# Patient Record
Sex: FEMALE | Race: ASIAN | URBAN - METROPOLITAN AREA
[De-identification: names, ages, dates, MRNs, and addresses within clinical notes are randomized per-mention and may not be internally consistent; named-entity substitution may affect disease eponyms.]

---

## 2021-04-25 ENCOUNTER — IMMUNIZATION (OUTPATIENT)
Dept: LAB | Age: 31
End: 2021-04-25

## 2021-04-25 DIAGNOSIS — Z23 NEED FOR VACCINATION: Primary | ICD-10-CM

## 2021-04-25 PROCEDURE — 0001A COVID 19 PFIZER-BIONTECH: CPT

## 2021-04-25 PROCEDURE — 91300 COVID 19 PFIZER-BIONTECH: CPT

## 2021-05-16 ENCOUNTER — IMMUNIZATION (OUTPATIENT)
Dept: LAB | Age: 31
End: 2021-05-16
Attending: HOSPITALIST

## 2021-05-16 DIAGNOSIS — Z23 NEED FOR VACCINATION: Primary | ICD-10-CM

## 2021-05-16 PROCEDURE — 91300 COVID 19 PFIZER-BIONTECH: CPT

## 2021-05-16 PROCEDURE — 0002A COVID 19 PFIZER-BIONTECH: CPT

## 2021-09-22 ENCOUNTER — HOSPITAL ENCOUNTER (EMERGENCY)
Age: 31
Discharge: LEFT BEFORE DISCHARGE | End: 2021-09-22

## 2021-09-22 VITALS
BODY MASS INDEX: 20.06 KG/M2 | HEIGHT: 65 IN | WEIGHT: 120.37 LBS | HEART RATE: 87 BPM | DIASTOLIC BLOOD PRESSURE: 76 MMHG | OXYGEN SATURATION: 100 % | TEMPERATURE: 97.9 F | RESPIRATION RATE: 16 BRPM | SYSTOLIC BLOOD PRESSURE: 126 MMHG

## 2021-09-22 PROCEDURE — 10003627 HB COUNTER ED NO SERVICE

## 2021-09-22 PROCEDURE — 93005 ELECTROCARDIOGRAM TRACING: CPT | Performed by: EMERGENCY MEDICINE

## 2021-09-23 LAB
ATRIAL RATE (BPM): 89
P AXIS (DEGREES): 54
PR-INTERVAL (MSEC): 148
QRS-INTERVAL (MSEC): 88
QT-INTERVAL (MSEC): 384
QTC: 467
R AXIS (DEGREES): 87
REPORT TEXT: NORMAL
T AXIS (DEGREES): 29
VENTRICULAR RATE EKG/MIN (BPM): 89

## 2023-09-06 ENCOUNTER — TELEPHONE (OUTPATIENT)
Dept: OBGYN CLINIC | Facility: CLINIC | Age: 33
End: 2023-09-06

## 2023-09-06 NOTE — TELEPHONE ENCOUNTER
LMP 7/4/23. Cycles every 30 days. Pt agrees to see both male and female providers. Pt has already started taking pn vitamin with DHA, folic acid and iron. Booked pt for OBN appt on 9/18/23. Pt also wishes to schedule new OB appt. Booked pt for new OB with [facility] on 9/19. Advised pt she will need to have 1st trimester labs completed prior to new OB appt otherwise this appt will be rescheduled. Pt verbalized understanding. Pt agrees to call clinic back should she have any additional questions or concerns.

## 2023-09-18 ENCOUNTER — NURSE ONLY (OUTPATIENT)
Dept: OBGYN CLINIC | Facility: CLINIC | Age: 33
End: 2023-09-18

## 2023-09-18 DIAGNOSIS — Z34.81 ENCOUNTER FOR SUPERVISION OF OTHER NORMAL PREGNANCY IN FIRST TRIMESTER: ICD-10-CM

## 2023-09-18 DIAGNOSIS — O26.811 PREGNANCY RELATED FATIGUE IN FIRST TRIMESTER: Primary | ICD-10-CM

## 2023-09-18 RX ORDER — CHOLECALCIFEROL (VITAMIN D3) 25 MCG
1 TABLET,CHEWABLE ORAL DAILY
COMMUNITY

## 2023-09-19 ENCOUNTER — LAB ENCOUNTER (OUTPATIENT)
Dept: LAB | Facility: HOSPITAL | Age: 33
End: 2023-09-19
Attending: OBSTETRICS & GYNECOLOGY
Payer: MEDICAID

## 2023-09-19 DIAGNOSIS — Z34.81 ENCOUNTER FOR SUPERVISION OF OTHER NORMAL PREGNANCY IN FIRST TRIMESTER: ICD-10-CM

## 2023-09-19 LAB
ANTIBODY SCREEN: NEGATIVE
BASOPHILS # BLD AUTO: 0.04 X10(3) UL (ref 0–0.2)
BASOPHILS NFR BLD AUTO: 0.5 %
DEPRECATED RDW RBC AUTO: 39.5 FL (ref 35.1–46.3)
EOSINOPHIL # BLD AUTO: 0.27 X10(3) UL (ref 0–0.7)
EOSINOPHIL NFR BLD AUTO: 3.4 %
ERYTHROCYTE [DISTWIDTH] IN BLOOD BY AUTOMATED COUNT: 12 % (ref 11–15)
HBV SURFACE AG SER-ACNC: <0.1 [IU]/L
HBV SURFACE AG SERPL QL IA: NONREACTIVE
HCG SERPL QL: POSITIVE
HCT VFR BLD AUTO: 38.8 %
HCV AB SERPL QL IA: NONREACTIVE
HGB BLD-MCNC: 13.3 G/DL
IMM GRANULOCYTES # BLD AUTO: 0.03 X10(3) UL (ref 0–1)
IMM GRANULOCYTES NFR BLD: 0.4 %
LYMPHOCYTES # BLD AUTO: 1.39 X10(3) UL (ref 1–4)
LYMPHOCYTES NFR BLD AUTO: 17.4 %
MCH RBC QN AUTO: 30.9 PG (ref 26–34)
MCHC RBC AUTO-ENTMCNC: 34.3 G/DL (ref 31–37)
MCV RBC AUTO: 90 FL
MONOCYTES # BLD AUTO: 0.45 X10(3) UL (ref 0.1–1)
MONOCYTES NFR BLD AUTO: 5.6 %
NEUTROPHILS # BLD AUTO: 5.8 X10 (3) UL (ref 1.5–7.7)
NEUTROPHILS # BLD AUTO: 5.8 X10(3) UL (ref 1.5–7.7)
NEUTROPHILS NFR BLD AUTO: 72.7 %
PLATELET # BLD AUTO: 210 10(3)UL (ref 150–450)
RBC # BLD AUTO: 4.31 X10(6)UL
RH BLOOD TYPE: POSITIVE
RUBV IGG SER QL: POSITIVE
RUBV IGG SER-ACNC: 50.9 IU/ML (ref 10–?)
WBC # BLD AUTO: 8 X10(3) UL (ref 4–11)

## 2023-09-19 PROCEDURE — 87340 HEPATITIS B SURFACE AG IA: CPT

## 2023-09-19 PROCEDURE — 84703 CHORIONIC GONADOTROPIN ASSAY: CPT

## 2023-09-19 PROCEDURE — 85025 COMPLETE CBC W/AUTO DIFF WBC: CPT

## 2023-09-19 PROCEDURE — 36415 COLL VENOUS BLD VENIPUNCTURE: CPT

## 2023-09-19 PROCEDURE — 86901 BLOOD TYPING SEROLOGIC RH(D): CPT

## 2023-09-19 PROCEDURE — 86762 RUBELLA ANTIBODY: CPT

## 2023-09-19 PROCEDURE — 86787 VARICELLA-ZOSTER ANTIBODY: CPT

## 2023-09-19 PROCEDURE — 87389 HIV-1 AG W/HIV-1&-2 AB AG IA: CPT

## 2023-09-19 PROCEDURE — 86900 BLOOD TYPING SEROLOGIC ABO: CPT

## 2023-09-19 PROCEDURE — 86780 TREPONEMA PALLIDUM: CPT

## 2023-09-19 PROCEDURE — 86850 RBC ANTIBODY SCREEN: CPT

## 2023-09-19 PROCEDURE — 87086 URINE CULTURE/COLONY COUNT: CPT

## 2023-09-19 PROCEDURE — 86803 HEPATITIS C AB TEST: CPT

## 2023-09-20 LAB
T PALLIDUM AB SER QL: NEGATIVE
VZV IGG SER IA-ACNC: 607.1 (ref 165–?)

## 2023-09-26 ENCOUNTER — INITIAL PRENATAL (OUTPATIENT)
Dept: OBGYN CLINIC | Facility: CLINIC | Age: 33
End: 2023-09-26

## 2023-09-26 ENCOUNTER — TELEPHONE (OUTPATIENT)
Dept: OBGYN CLINIC | Facility: CLINIC | Age: 33
End: 2023-09-26

## 2023-09-26 VITALS
WEIGHT: 124 LBS | HEART RATE: 86 BPM | SYSTOLIC BLOOD PRESSURE: 101 MMHG | HEIGHT: 65 IN | BODY MASS INDEX: 20.66 KG/M2 | DIASTOLIC BLOOD PRESSURE: 69 MMHG

## 2023-09-26 DIAGNOSIS — Z36.9 FIRST TRIMESTER SCREENING: Primary | ICD-10-CM

## 2023-09-26 DIAGNOSIS — Z34.91 ENCOUNTER FOR SUPERVISION OF NORMAL PREGNANCY IN FIRST TRIMESTER, UNSPECIFIED GRAVIDITY: Primary | ICD-10-CM

## 2023-09-26 PROBLEM — N84.1 CERVICAL POLYP: Status: ACTIVE | Noted: 2023-09-26

## 2023-09-26 LAB
BILIRUBIN: NEGATIVE
GLUCOSE (URINE DIPSTICK): NEGATIVE MG/DL
KETONES (URINE DIPSTICK): NEGATIVE MG/DL
LEUKOCYTES: NEGATIVE
MULTISTIX LOT#: 57 NUMERIC
NITRITE, URINE: NEGATIVE
OCCULT BLOOD: NEGATIVE
PH, URINE: 7.5 (ref 4.5–8)
PROTEIN (URINE DIPSTICK): NEGATIVE MG/DL
SPECIFIC GRAVITY: 1.01 (ref 1–1.03)
UROBILINOGEN,SEMI-QN: 0.2 MG/DL (ref 0–1.9)

## 2023-09-26 PROCEDURE — 3008F BODY MASS INDEX DOCD: CPT | Performed by: OBSTETRICS & GYNECOLOGY

## 2023-09-26 PROCEDURE — 76801 OB US < 14 WKS SINGLE FETUS: CPT | Performed by: OBSTETRICS & GYNECOLOGY

## 2023-09-26 PROCEDURE — 3078F DIAST BP <80 MM HG: CPT | Performed by: OBSTETRICS & GYNECOLOGY

## 2023-09-26 PROCEDURE — 3074F SYST BP LT 130 MM HG: CPT | Performed by: OBSTETRICS & GYNECOLOGY

## 2023-09-26 PROCEDURE — 81002 URINALYSIS NONAUTO W/O SCOPE: CPT | Performed by: OBSTETRICS & GYNECOLOGY

## 2023-09-26 PROCEDURE — 0500F INITIAL PRENATAL CARE VISIT: CPT | Performed by: OBSTETRICS & GYNECOLOGY

## 2023-09-26 NOTE — TELEPHONE ENCOUNTER
Rene Jc from  provided pt with MFM number.  And pt is aware she is to call and schedule appt for FTS

## 2023-09-26 NOTE — TELEPHONE ENCOUNTER
Pt wishes / needs the following.  Please do referral & send MFM order for:    [ x ]  Genetic Screening

## 2023-09-26 NOTE — TELEPHONE ENCOUNTER
[facility]    Genetic screening order has been placed. And is to contact Maternal Fetal Medicine at [phone] to schedule appt.

## 2023-09-26 NOTE — PROGRESS NOTES
New OB. Pap and GCT collected today. Requesting genetic testing. Cervical polyp noted on exam today. US SLIUP CWD.  RTC 4 wks

## 2023-09-27 ENCOUNTER — TELEPHONE (OUTPATIENT)
Dept: OBGYN CLINIC | Facility: CLINIC | Age: 33
End: 2023-09-27

## 2023-09-27 PROBLEM — A74.9 POSITIVE CHLAMYDIA PCR: Status: ACTIVE | Noted: 2023-09-27

## 2023-09-27 LAB
C TRACH DNA SPEC QL NAA+PROBE: POSITIVE
HPV I/H RISK 1 DNA SPEC QL NAA+PROBE: NEGATIVE
N GONORRHOEA DNA SPEC QL NAA+PROBE: NEGATIVE
T VAGINALIS RRNA SPEC QL NAA+PROBE: NEGATIVE

## 2023-09-27 RX ORDER — AZITHROMYCIN 500 MG/1
1000 TABLET, FILM COATED ORAL ONCE
Qty: 2 TABLET | Refills: 0 | Status: SHIPPED | OUTPATIENT
Start: 2023-09-27 | End: 2023-09-27

## 2023-09-27 NOTE — TELEPHONE ENCOUNTER
Please send Azithromycin 1 g for one dose. Her partner also needs to get treatment. She needs to abstain from intercourse until both of them have completed treatment.

## 2023-09-27 NOTE — TELEPHONE ENCOUNTER
[facility] reporting +chlamydia results. Pt is 12w1d, seen for NPN on 9/26 with TODD. Message to [facility] (on-call) to please review and advise.      Component  Ref Range & Units    Chlamydia Trachomatis Amplified RNA  Negative Positive Abnormal    Neisseria Gonorrhoeae Amplified RNA  Negative Negative   Chlam/GC Source Cervical/endocervical   Resulting Agency [place] [facility] [address])              Specimen Collected: 09/26/23  9:51 AM Last Resulted: 09/27/23  2:16 PM

## 2023-09-27 NOTE — TELEPHONE ENCOUNTER
Informed pt of results and JMN recs. Pt verbalized understanding. Pt's preferred pharmacy verified and Rx sent.

## 2023-09-28 ENCOUNTER — HOSPITAL ENCOUNTER (OUTPATIENT)
Dept: PERINATAL CARE | Facility: HOSPITAL | Age: 33
Discharge: HOME | End: 2023-09-28
Attending: OBSTETRICS & GYNECOLOGY
Payer: MEDICAID

## 2023-09-28 ENCOUNTER — LAB ENCOUNTER (OUTPATIENT)
Dept: LAB | Facility: HOSPITAL | Age: 33
End: 2023-09-28
Attending: OBSTETRICS & GYNECOLOGY
Payer: MEDICAID

## 2023-09-28 VITALS
HEART RATE: 84 BPM | BODY MASS INDEX: 21 KG/M2 | SYSTOLIC BLOOD PRESSURE: 113 MMHG | DIASTOLIC BLOOD PRESSURE: 80 MMHG | WEIGHT: 124 LBS

## 2023-09-28 DIAGNOSIS — Z36.9 FIRST TRIMESTER SCREENING: ICD-10-CM

## 2023-09-28 DIAGNOSIS — A74.9 POSITIVE CHLAMYDIA PCR: ICD-10-CM

## 2023-09-28 DIAGNOSIS — N84.1 CERVICAL POLYP: ICD-10-CM

## 2023-09-28 DIAGNOSIS — Z31.69 GENERAL COUNSELING AND ADVICE FOR PROCREATIVE MANAGEMENT: ICD-10-CM

## 2023-09-28 DIAGNOSIS — Z34.81 PRENATAL CARE, SUBSEQUENT PREGNANCY, FIRST TRIMESTER: ICD-10-CM

## 2023-09-28 DIAGNOSIS — Z36.9 FIRST TRIMESTER SCREENING: Primary | ICD-10-CM

## 2023-09-28 PROCEDURE — 76813 OB US NUCHAL MEAS 1 GEST: CPT | Performed by: OBSTETRICS & GYNECOLOGY

## 2023-09-28 PROCEDURE — 76801 OB US < 14 WKS SINGLE FETUS: CPT | Performed by: OBSTETRICS & GYNECOLOGY

## 2023-09-29 ENCOUNTER — LAB ENCOUNTER (OUTPATIENT)
Dept: LAB | Facility: HOSPITAL | Age: 33
End: 2023-09-29
Attending: OBSTETRICS & GYNECOLOGY
Payer: MEDICAID

## 2023-09-29 DIAGNOSIS — Z34.81 PRENATAL CARE, SUBSEQUENT PREGNANCY, FIRST TRIMESTER: ICD-10-CM

## 2023-10-01 ENCOUNTER — PATIENT MESSAGE (OUTPATIENT)
Dept: OBGYN CLINIC | Facility: CLINIC | Age: 33
End: 2023-10-01

## 2023-10-03 NOTE — TELEPHONE ENCOUNTER
From: Kartik Medina  To: Henrry Badillo  Sent: 10/1/2023 5:53 PM CDT  Subject: azithromycin    Sola Tristan Rick  I took azithromycin last night and felt like vomiting and had diarrhea and today i feel nauseous, and feel like throwing up is it normal?

## 2023-10-04 LAB
GESTATIONAL AGE > OR = 9W:: YES
MONOSOMY X (TURNER SYNDROME): NOT DETECTED
TEST RESULT: NEGATIVE
TRISOMY 13 (PATAU SYNDROME): NEGATIVE
TRISOMY 18 (EDWARDS SYNDROME): NEGATIVE
TRISOMY 21 (DOWN SYNDROME): NEGATIVE
XXX (TRIPLE X SYNDROME): NOT DETECTED
XXY (KLINEFELTER SYNDROME): NOT DETECTED
XYY (JACOBS SYNDROME): NOT DETECTED

## 2023-10-05 ENCOUNTER — TELEPHONE (OUTPATIENT)
Dept: OBGYN CLINIC | Facility: CLINIC | Age: 33
End: 2023-10-05

## 2023-10-05 NOTE — TELEPHONE ENCOUNTER
Pt receiving care through [facility]. Pt wanting to transfer to the [facility] office. Pt scheduled for meet and greet on 10/10/23 11:15 am with Florence Ordonez CNM.

## 2023-10-05 NOTE — TELEPHONE ENCOUNTER
Patient has a few questions she would like to ask prior to transferring care from [facility]. Please call.

## 2023-10-24 ENCOUNTER — ROUTINE PRENATAL (OUTPATIENT)
Dept: OBGYN CLINIC | Facility: CLINIC | Age: 33
End: 2023-10-24

## 2023-10-24 VITALS
SYSTOLIC BLOOD PRESSURE: 103 MMHG | WEIGHT: 127.38 LBS | DIASTOLIC BLOOD PRESSURE: 73 MMHG | BODY MASS INDEX: 21 KG/M2 | HEART RATE: 88 BPM

## 2023-10-24 DIAGNOSIS — Z34.92 ENCOUNTER FOR SUPERVISION OF NORMAL PREGNANCY IN SECOND TRIMESTER, UNSPECIFIED GRAVIDITY: Primary | ICD-10-CM

## 2023-10-24 LAB
BILIRUBIN: NEGATIVE
GLUCOSE (URINE DIPSTICK): NEGATIVE MG/DL
KETONES (URINE DIPSTICK): NEGATIVE MG/DL
MULTISTIX LOT#: ABNORMAL NUMERIC
NITRITE, URINE: NEGATIVE
OCCULT BLOOD: NEGATIVE
PH, URINE: 7.5 (ref 4.5–8)
SPECIFIC GRAVITY: 1.01 (ref 1–1.03)
UROBILINOGEN,SEMI-QN: 0.2 MG/DL (ref 0–1.9)

## 2023-10-24 PROCEDURE — 3078F DIAST BP <80 MM HG: CPT | Performed by: OBSTETRICS & GYNECOLOGY

## 2023-10-24 PROCEDURE — 3074F SYST BP LT 130 MM HG: CPT | Performed by: OBSTETRICS & GYNECOLOGY

## 2023-10-24 PROCEDURE — 99212 OFFICE O/P EST SF 10 MIN: CPT | Performed by: OBSTETRICS & GYNECOLOGY

## 2023-10-24 PROCEDURE — 81002 URINALYSIS NONAUTO W/O SCOPE: CPT | Performed by: OBSTETRICS & GYNECOLOGY

## 2023-10-24 NOTE — PROGRESS NOTES
Undecided on flu shot. Only took meds 3 1/2 wks ago -- will need GC/Chl SALOME next visit. Asked pt about wish to transfer to midwives -- pt never did appt -- wishes to stay w/ us. Reviewed if wishes for birth plan we need to review in office.

## 2023-11-15 ENCOUNTER — ROUTINE PRENATAL (OUTPATIENT)
Dept: OBGYN CLINIC | Facility: CLINIC | Age: 33
End: 2023-11-15

## 2023-11-15 VITALS
WEIGHT: 128 LBS | SYSTOLIC BLOOD PRESSURE: 104 MMHG | DIASTOLIC BLOOD PRESSURE: 70 MMHG | BODY MASS INDEX: 21 KG/M2 | HEART RATE: 73 BPM

## 2023-11-15 DIAGNOSIS — Z34.82 ENCOUNTER FOR SUPERVISION OF OTHER NORMAL PREGNANCY IN SECOND TRIMESTER: Primary | ICD-10-CM

## 2023-11-15 LAB
APPEARANCE: CLEAR
GLUCOSE (URINE DIPSTICK): NEGATIVE MG/DL
MULTISTIX LOT#: NORMAL NUMERIC
NITRITE, URINE: NEGATIVE
URINE-COLOR: YELLOW

## 2023-11-15 PROCEDURE — 81002 URINALYSIS NONAUTO W/O SCOPE: CPT | Performed by: OBSTETRICS & GYNECOLOGY

## 2023-11-15 PROCEDURE — 3078F DIAST BP <80 MM HG: CPT | Performed by: OBSTETRICS & GYNECOLOGY

## 2023-11-15 PROCEDURE — 3074F SYST BP LT 130 MM HG: CPT | Performed by: OBSTETRICS & GYNECOLOGY

## 2023-11-16 LAB
C TRACH DNA SPEC QL NAA+PROBE: NEGATIVE
N GONORRHOEA DNA SPEC QL NAA+PROBE: NEGATIVE

## 2023-11-17 LAB — T VAGINALIS RRNA SPEC QL NAA+PROBE: NEGATIVE

## 2023-12-04 ENCOUNTER — HOSPITAL ENCOUNTER (OUTPATIENT)
Dept: ULTRASOUND IMAGING | Facility: HOSPITAL | Age: 33
Discharge: HOME | End: 2023-12-04
Attending: OBSTETRICS & GYNECOLOGY
Payer: MEDICAID

## 2023-12-04 DIAGNOSIS — Z34.92 ENCOUNTER FOR SUPERVISION OF NORMAL PREGNANCY IN SECOND TRIMESTER, UNSPECIFIED GRAVIDITY: ICD-10-CM

## 2023-12-04 PROCEDURE — 76805 OB US >/= 14 WKS SNGL FETUS: CPT | Performed by: OBSTETRICS & GYNECOLOGY

## 2023-12-18 ENCOUNTER — ROUTINE PRENATAL (OUTPATIENT)
Dept: OBGYN CLINIC | Facility: CLINIC | Age: 33
End: 2023-12-18

## 2023-12-18 VITALS
HEART RATE: 74 BPM | DIASTOLIC BLOOD PRESSURE: 67 MMHG | SYSTOLIC BLOOD PRESSURE: 104 MMHG | WEIGHT: 136.63 LBS | BODY MASS INDEX: 23 KG/M2

## 2023-12-18 DIAGNOSIS — Z34.82 ENCOUNTER FOR SUPERVISION OF OTHER NORMAL PREGNANCY IN SECOND TRIMESTER: Primary | ICD-10-CM

## 2023-12-18 LAB
APPEARANCE: CLEAR
BILIRUBIN: NEGATIVE
GLUCOSE (URINE DIPSTICK): NEGATIVE MG/DL
KETONES (URINE DIPSTICK): NEGATIVE MG/DL
MULTISTIX LOT#: ABNORMAL NUMERIC
NITRITE, URINE: NEGATIVE
PH, URINE: 6.5 (ref 4.5–8)
SPECIFIC GRAVITY: 1.01 (ref 1–1.03)
URINE-COLOR: YELLOW
UROBILINOGEN,SEMI-QN: 0.2 MG/DL (ref 0–1.9)

## 2023-12-18 PROCEDURE — 3078F DIAST BP <80 MM HG: CPT | Performed by: OBSTETRICS & GYNECOLOGY

## 2023-12-18 PROCEDURE — 3074F SYST BP LT 130 MM HG: CPT | Performed by: OBSTETRICS & GYNECOLOGY

## 2023-12-18 PROCEDURE — 81002 URINALYSIS NONAUTO W/O SCOPE: CPT | Performed by: OBSTETRICS & GYNECOLOGY

## 2024-01-23 ENCOUNTER — ROUTINE PRENATAL (OUTPATIENT)
Dept: OBGYN CLINIC | Facility: CLINIC | Age: 34
End: 2024-01-23

## 2024-01-23 ENCOUNTER — LAB ENCOUNTER (OUTPATIENT)
Dept: LAB | Facility: HOSPITAL | Age: 34
End: 2024-01-23
Attending: OBSTETRICS & GYNECOLOGY
Payer: COMMERCIAL

## 2024-01-23 VITALS
WEIGHT: 139 LBS | HEART RATE: 86 BPM | BODY MASS INDEX: 23 KG/M2 | SYSTOLIC BLOOD PRESSURE: 104 MMHG | DIASTOLIC BLOOD PRESSURE: 70 MMHG

## 2024-01-23 DIAGNOSIS — Z34.92 ENCOUNTER FOR SUPERVISION OF NORMAL PREGNANCY IN SECOND TRIMESTER, UNSPECIFIED GRAVIDITY: Primary | ICD-10-CM

## 2024-01-23 LAB
BILIRUBIN: NEGATIVE
DEPRECATED RDW RBC AUTO: 40.9 FL (ref 35.1–46.3)
ERYTHROCYTE [DISTWIDTH] IN BLOOD BY AUTOMATED COUNT: 12.2 % (ref 11–15)
GLUCOSE (URINE DIPSTICK): NEGATIVE MG/DL
GLUCOSE 1H P GLC SERPL-MCNC: 149 MG/DL
HCT VFR BLD AUTO: 36.2 %
HGB BLD-MCNC: 11.9 G/DL
KETONES (URINE DIPSTICK): NEGATIVE MG/DL
LEUKOCYTES: NEGATIVE
MCH RBC QN AUTO: 30.1 PG (ref 26–34)
MCHC RBC AUTO-ENTMCNC: 32.9 G/DL (ref 31–37)
MCV RBC AUTO: 91.6 FL
MULTISTIX LOT#: 57 NUMERIC
NITRITE, URINE: NEGATIVE
OCCULT BLOOD: NEGATIVE
PH, URINE: 7.5 (ref 4.5–8)
PLATELET # BLD AUTO: 172 10(3)UL (ref 150–450)
RBC # BLD AUTO: 3.95 X10(6)UL
SPECIFIC GRAVITY: 1.01 (ref 1–1.03)
UROBILINOGEN,SEMI-QN: 0.2 MG/DL (ref 0–1.9)
WBC # BLD AUTO: 8.7 X10(3) UL (ref 4–11)

## 2024-01-23 PROCEDURE — 85027 COMPLETE CBC AUTOMATED: CPT | Performed by: OBSTETRICS & GYNECOLOGY

## 2024-01-23 PROCEDURE — 3078F DIAST BP <80 MM HG: CPT | Performed by: OBSTETRICS & GYNECOLOGY

## 2024-01-23 PROCEDURE — 3074F SYST BP LT 130 MM HG: CPT | Performed by: OBSTETRICS & GYNECOLOGY

## 2024-01-23 PROCEDURE — 36415 COLL VENOUS BLD VENIPUNCTURE: CPT | Performed by: OBSTETRICS & GYNECOLOGY

## 2024-01-23 PROCEDURE — 82950 GLUCOSE TEST: CPT | Performed by: OBSTETRICS & GYNECOLOGY

## 2024-01-23 PROCEDURE — 81002 URINALYSIS NONAUTO W/O SCOPE: CPT | Performed by: OBSTETRICS & GYNECOLOGY

## 2024-01-23 NOTE — PROGRESS NOTES
Feeling well.  Good FM.  This appt was a reschedule due to bad weather last week.  Has 2T labs rx'ed.  RTC 2 wks.

## 2024-01-24 ENCOUNTER — TELEPHONE (OUTPATIENT)
Dept: OBGYN CLINIC | Facility: CLINIC | Age: 34
End: 2024-01-24

## 2024-01-24 DIAGNOSIS — O99.810 ABNORMAL GLUCOSE TOLERANCE TEST (GTT) DURING PREGNANCY, ANTEPARTUM: Primary | ICD-10-CM

## 2024-01-24 NOTE — TELEPHONE ENCOUNTER
Pt informed of results and recs for 3 hr gtt.  Pt advised to fast for 12 hours and call for appt.  Number to central scheduling given.  Order placed.

## 2024-02-07 ENCOUNTER — ROUTINE PRENATAL (OUTPATIENT)
Dept: OBGYN CLINIC | Facility: CLINIC | Age: 34
End: 2024-02-07

## 2024-02-07 VITALS
HEART RATE: 78 BPM | WEIGHT: 141 LBS | SYSTOLIC BLOOD PRESSURE: 107 MMHG | DIASTOLIC BLOOD PRESSURE: 71 MMHG | BODY MASS INDEX: 23 KG/M2

## 2024-02-07 DIAGNOSIS — Z34.80 ENCOUNTER FOR SUPERVISION OF OTHER NORMAL PREGNANCY, UNSPECIFIED TRIMESTER: Primary | ICD-10-CM

## 2024-02-07 LAB
BILIRUBIN: NEGATIVE
GLUCOSE (URINE DIPSTICK): NEGATIVE MG/DL
KETONES (URINE DIPSTICK): NEGATIVE MG/DL
MULTISTIX LOT#: ABNORMAL NUMERIC
NITRITE, URINE: NEGATIVE
PH, URINE: 7 (ref 4.5–8)
PROTEIN (URINE DIPSTICK): NEGATIVE MG/DL
SPECIFIC GRAVITY: 1 (ref 1–1.03)
UROBILINOGEN,SEMI-QN: 0.2 MG/DL (ref 0–1.9)

## 2024-02-07 PROCEDURE — 3074F SYST BP LT 130 MM HG: CPT | Performed by: OBSTETRICS & GYNECOLOGY

## 2024-02-07 PROCEDURE — 3078F DIAST BP <80 MM HG: CPT | Performed by: OBSTETRICS & GYNECOLOGY

## 2024-02-07 PROCEDURE — 81002 URINALYSIS NONAUTO W/O SCOPE: CPT | Performed by: OBSTETRICS & GYNECOLOGY

## 2024-02-19 ENCOUNTER — ROUTINE PRENATAL (OUTPATIENT)
Dept: OBGYN CLINIC | Facility: CLINIC | Age: 34
End: 2024-02-19

## 2024-02-19 VITALS
SYSTOLIC BLOOD PRESSURE: 102 MMHG | HEART RATE: 85 BPM | DIASTOLIC BLOOD PRESSURE: 69 MMHG | WEIGHT: 142 LBS | BODY MASS INDEX: 24 KG/M2

## 2024-02-19 DIAGNOSIS — Z34.91 ENCOUNTER FOR SUPERVISION OF NORMAL PREGNANCY IN FIRST TRIMESTER, UNSPECIFIED GRAVIDITY (HCC): Primary | ICD-10-CM

## 2024-02-19 LAB
APPEARANCE: CLEAR
BILIRUBIN: NEGATIVE
GLUCOSE (URINE DIPSTICK): NEGATIVE MG/DL
KETONES (URINE DIPSTICK): NEGATIVE MG/DL
MULTISTIX LOT#: ABNORMAL NUMERIC
NITRITE, URINE: NEGATIVE
OCCULT BLOOD: NEGATIVE
PH, URINE: 7 (ref 4.5–8)
PROTEIN (URINE DIPSTICK): NEGATIVE MG/DL
SPECIFIC GRAVITY: 1.01 (ref 1–1.03)
URINE-COLOR: YELLOW
UROBILINOGEN,SEMI-QN: 0.2 MG/DL (ref 0–1.9)

## 2024-02-19 PROCEDURE — 3074F SYST BP LT 130 MM HG: CPT | Performed by: OBSTETRICS & GYNECOLOGY

## 2024-02-19 PROCEDURE — 3078F DIAST BP <80 MM HG: CPT | Performed by: OBSTETRICS & GYNECOLOGY

## 2024-02-19 PROCEDURE — 81002 URINALYSIS NONAUTO W/O SCOPE: CPT | Performed by: OBSTETRICS & GYNECOLOGY

## 2024-02-22 ENCOUNTER — LABORATORY ENCOUNTER (OUTPATIENT)
Dept: LAB | Facility: HOSPITAL | Age: 34
End: 2024-02-22
Attending: OBSTETRICS & GYNECOLOGY
Payer: COMMERCIAL

## 2024-02-22 DIAGNOSIS — O99.810 ABNORMAL GLUCOSE TOLERANCE TEST (GTT) DURING PREGNANCY, ANTEPARTUM (HCC): ICD-10-CM

## 2024-02-22 LAB
GLUCOSE 1H P GLC SERPL-MCNC: 163 MG/DL
GLUCOSE 2H P GLC SERPL-MCNC: 129 MG/DL
GLUCOSE 3H P GLC SERPL-MCNC: 107 MG/DL (ref 70–140)
GLUCOSE P FAST SERPL-MCNC: 72 MG/DL

## 2024-02-22 PROCEDURE — 82951 GLUCOSE TOLERANCE TEST (GTT): CPT

## 2024-02-22 PROCEDURE — 82952 GTT-ADDED SAMPLES: CPT

## 2024-02-22 PROCEDURE — 36415 COLL VENOUS BLD VENIPUNCTURE: CPT

## 2024-03-05 ENCOUNTER — ROUTINE PRENATAL (OUTPATIENT)
Dept: OBGYN CLINIC | Facility: CLINIC | Age: 34
End: 2024-03-05

## 2024-03-05 ENCOUNTER — LAB ENCOUNTER (OUTPATIENT)
Dept: LAB | Facility: HOSPITAL | Age: 34
End: 2024-03-05
Attending: OBSTETRICS & GYNECOLOGY
Payer: COMMERCIAL

## 2024-03-05 VITALS
HEART RATE: 73 BPM | SYSTOLIC BLOOD PRESSURE: 112 MMHG | WEIGHT: 143.19 LBS | DIASTOLIC BLOOD PRESSURE: 73 MMHG | BODY MASS INDEX: 24 KG/M2

## 2024-03-05 DIAGNOSIS — Z34.91 ENCOUNTER FOR SUPERVISION OF NORMAL PREGNANCY IN FIRST TRIMESTER, UNSPECIFIED GRAVIDITY (HCC): Primary | ICD-10-CM

## 2024-03-05 DIAGNOSIS — Z34.91 ENCOUNTER FOR SUPERVISION OF NORMAL PREGNANCY IN FIRST TRIMESTER, UNSPECIFIED GRAVIDITY (HCC): ICD-10-CM

## 2024-03-05 LAB
BILIRUBIN: NEGATIVE
DEPRECATED RDW RBC AUTO: 40.7 FL (ref 35.1–46.3)
ERYTHROCYTE [DISTWIDTH] IN BLOOD BY AUTOMATED COUNT: 12.4 % (ref 11–15)
GLUCOSE (URINE DIPSTICK): NEGATIVE MG/DL
HCT VFR BLD AUTO: 39.8 %
HGB BLD-MCNC: 13.1 G/DL
KETONES (URINE DIPSTICK): NEGATIVE MG/DL
MCH RBC QN AUTO: 29.9 PG (ref 26–34)
MCHC RBC AUTO-ENTMCNC: 32.9 G/DL (ref 31–37)
MCV RBC AUTO: 90.9 FL
MULTISTIX LOT#: ABNORMAL NUMERIC
NITRITE, URINE: NEGATIVE
OCCULT BLOOD: NEGATIVE
PH, URINE: 7.5 (ref 4.5–8)
PLATELET # BLD AUTO: 172 10(3)UL (ref 150–450)
RBC # BLD AUTO: 4.38 X10(6)UL
SPECIFIC GRAVITY: 1.01 (ref 1–1.03)
T PALLIDUM AB SER QL IA: NONREACTIVE
URINE-COLOR: YELLOW
UROBILINOGEN,SEMI-QN: 0.2 MG/DL (ref 0–1.9)
WBC # BLD AUTO: 10.1 X10(3) UL (ref 4–11)

## 2024-03-05 PROCEDURE — 87389 HIV-1 AG W/HIV-1&-2 AB AG IA: CPT

## 2024-03-05 PROCEDURE — 85027 COMPLETE CBC AUTOMATED: CPT

## 2024-03-05 PROCEDURE — 86780 TREPONEMA PALLIDUM: CPT

## 2024-03-05 PROCEDURE — 81002 URINALYSIS NONAUTO W/O SCOPE: CPT | Performed by: OBSTETRICS & GYNECOLOGY

## 2024-03-05 PROCEDURE — 36415 COLL VENOUS BLD VENIPUNCTURE: CPT

## 2024-03-05 PROCEDURE — 3074F SYST BP LT 130 MM HG: CPT | Performed by: OBSTETRICS & GYNECOLOGY

## 2024-03-05 PROCEDURE — 3078F DIAST BP <80 MM HG: CPT | Performed by: OBSTETRICS & GYNECOLOGY

## 2024-03-08 ENCOUNTER — TELEPHONE (OUTPATIENT)
Dept: OBGYN CLINIC | Facility: CLINIC | Age: 34
End: 2024-03-08

## 2024-03-08 NOTE — TELEPHONE ENCOUNTER
Received faxed order for beast pump from [employer]. Completed and faxed back to [employer]. Sent to scanning.

## 2024-03-13 ENCOUNTER — ROUTINE PRENATAL (OUTPATIENT)
Dept: OBGYN CLINIC | Facility: CLINIC | Age: 34
End: 2024-03-13
Payer: COMMERCIAL

## 2024-03-13 VITALS
HEART RATE: 80 BPM | BODY MASS INDEX: 24 KG/M2 | DIASTOLIC BLOOD PRESSURE: 72 MMHG | WEIGHT: 145.38 LBS | SYSTOLIC BLOOD PRESSURE: 109 MMHG

## 2024-03-13 DIAGNOSIS — Z34.93 ENCOUNTER FOR SUPERVISION OF NORMAL PREGNANCY IN THIRD TRIMESTER, UNSPECIFIED GRAVIDITY (HCC): Primary | ICD-10-CM

## 2024-03-13 LAB
APPEARANCE: CLEAR
BILIRUBIN: NEGATIVE
GLUCOSE (URINE DIPSTICK): NEGATIVE MG/DL
KETONES (URINE DIPSTICK): NEGATIVE MG/DL
MULTISTIX LOT#: ABNORMAL NUMERIC
NITRITE, URINE: NEGATIVE
OCCULT BLOOD: NEGATIVE
PH, URINE: 7 (ref 4.5–8)
PROTEIN (URINE DIPSTICK): NEGATIVE MG/DL
SPECIFIC GRAVITY: 1.01 (ref 1–1.03)
URINE-COLOR: YELLOW
UROBILINOGEN,SEMI-QN: 1 MG/DL (ref 0–1.9)

## 2024-03-13 PROCEDURE — 99213 OFFICE O/P EST LOW 20 MIN: CPT | Performed by: OBSTETRICS & GYNECOLOGY

## 2024-03-13 PROCEDURE — 81002 URINALYSIS NONAUTO W/O SCOPE: CPT | Performed by: OBSTETRICS & GYNECOLOGY

## 2024-03-13 NOTE — PROGRESS NOTES
Doing well. GBS collected. RTC 1 wk    See Prenatal Vitals flowsheet for documentation of vitals / urine / exam. See problem list -- updated in detail.   Used 20-29 min in order to do detailed chart review & review of blood work / ultrasound reports / MFM reports, reviewing history, performing prenatal exam / pelvic exam, counseling pt on vaccines, prenatal education, ordering tests or meds, ordering MFM /specialist referrals, documentation in EMR, interpretation / communication of results & care coordination

## 2024-03-14 LAB — GROUP B STREP BY PCR FOR PCR OVT: NEGATIVE

## 2024-03-18 ENCOUNTER — TELEPHONE (OUTPATIENT)
Dept: OBGYN CLINIC | Facility: CLINIC | Age: 34
End: 2024-03-18

## 2024-03-18 ENCOUNTER — NURSE ONLY (OUTPATIENT)
Dept: OBGYN CLINIC | Facility: CLINIC | Age: 34
End: 2024-03-18
Payer: COMMERCIAL

## 2024-03-18 ENCOUNTER — HOSPITAL ENCOUNTER (OUTPATIENT)
Facility: HOSPITAL | Age: 34
Discharge: HOME | End: 2024-03-18
Attending: OBSTETRICS & GYNECOLOGY | Admitting: OBSTETRICS & GYNECOLOGY
Payer: COMMERCIAL

## 2024-03-18 ENCOUNTER — ROUTINE PRENATAL (OUTPATIENT)
Dept: OBGYN CLINIC | Facility: CLINIC | Age: 34
End: 2024-03-18
Payer: COMMERCIAL

## 2024-03-18 VITALS
SYSTOLIC BLOOD PRESSURE: 105 MMHG | BODY MASS INDEX: 24 KG/M2 | WEIGHT: 146.63 LBS | HEART RATE: 81 BPM | DIASTOLIC BLOOD PRESSURE: 76 MMHG

## 2024-03-18 VITALS — SYSTOLIC BLOOD PRESSURE: 107 MMHG | HEART RATE: 53 BPM | DIASTOLIC BLOOD PRESSURE: 74 MMHG

## 2024-03-18 VITALS — SYSTOLIC BLOOD PRESSURE: 117 MMHG | HEART RATE: 67 BPM | DIASTOLIC BLOOD PRESSURE: 58 MMHG

## 2024-03-18 DIAGNOSIS — Z34.93 ENCOUNTER FOR SUPERVISION OF NORMAL PREGNANCY IN THIRD TRIMESTER, UNSPECIFIED GRAVIDITY (HCC): Primary | ICD-10-CM

## 2024-03-18 LAB
APPEARANCE: CLEAR
BILIRUBIN: NEGATIVE
GLUCOSE (URINE DIPSTICK): NEGATIVE MG/DL
KETONES (URINE DIPSTICK): NEGATIVE MG/DL
MULTISTIX LOT#: ABNORMAL NUMERIC
NITRITE, URINE: NEGATIVE
PH, URINE: 6.5 (ref 4.5–8)
PROTEIN (URINE DIPSTICK): NEGATIVE MG/DL
SPECIFIC GRAVITY: 1 (ref 1–1.03)
URINE-COLOR: YELLOW
UROBILINOGEN,SEMI-QN: 0.2 MG/DL (ref 0–1.9)

## 2024-03-18 PROCEDURE — 99212 OFFICE O/P EST SF 10 MIN: CPT | Performed by: OBSTETRICS & GYNECOLOGY

## 2024-03-18 PROCEDURE — 3078F DIAST BP <80 MM HG: CPT | Performed by: OBSTETRICS & GYNECOLOGY

## 2024-03-18 PROCEDURE — 81002 URINALYSIS NONAUTO W/O SCOPE: CPT | Performed by: OBSTETRICS & GYNECOLOGY

## 2024-03-18 PROCEDURE — 3074F SYST BP LT 130 MM HG: CPT | Performed by: OBSTETRICS & GYNECOLOGY

## 2024-03-18 PROCEDURE — 99212 OFFICE O/P EST SF 10 MIN: CPT

## 2024-03-18 PROCEDURE — 59025 FETAL NON-STRESS TEST: CPT

## 2024-03-18 NOTE — TRIAGE
[facility]  part of Merg[facility] with [facility]      Triage Note    Eliud Mckeon Patient Status:  Outpatient    1990 MRN [id number]   Location [facility] Attending Edgardo Vences,    Hosp Day # 0 PCP No primary care provider on file.      Para:   Estimated Date of Delivery: 24  Gestation: 36w6d    Chief Complaint    Decreased Fetal Movement; Non-stress Test         Allergies:  No Known Allergies    No orders of the defined types were placed in this encounter.      Lab Results   Component Value Date    WBC 10.1 2024    HGB 13.1 2024    HCT 39.8 2024    .0 2024       Clinitek UA  Lab Results   Component Value Date    SPECGRAVITY 1.005 2024    URINECUL No Growth at 18-24 hrs. 2023       UA  Lab Results   Component Value Date    SPECGRAVITY 1.005 2024    NITRITE Negative 2024       Vitals:    24 1156   BP: 117/58   Pulse: 67       NST  Variability: Moderate           Accelerations: Yes           Decelerations: None            Baseline: 125 BPM           Uterine Irritability: No           Contractions: Irregular                    Contraction Frequency: none felt per pt                   Acoustic Stimulator: No           Nonstress Test Interpretation: Reactive           Nonstress Test Second Interpretation: Reactive          FHR Category: Category I             Additional Comments Comments: Reactive tracing, + FM. Pt feeling more 'comforable/relaxed\". Dr. Vences notified, orders for discharge recieved. Reviewed kick counts with pt. Pt denies questions at this time. Home amb accompanied by SO.     Chief Complaint   Patient presents with    Decreased Fetal Movement     Pt has felt movement since leaving office and getting something to eat.    Non-stress Test         Corinne S, RN  3/18/2024 12:33 PM

## 2024-03-18 NOTE — PROGRESS NOTES
Pt is 36w6d and presented to  this morning with c/o decreased FM. Pt stated that she has not felt movement since last night and is concerned. Pt has appt with FRANDY at 11am today but wanted to come earlier to get checked out.     RN brought pt to room for assessment and FHT check. Asked pt if she had eaten or drank anything this morning and pt stated no.  FHTs in office were 125-133 bpm and pt reassured. Pt also given 2 full cups of cold water to drink and laid on left side. RN came back to check on pt 10 minutes later and pt had reported that she had felt 4 fetal movements since drinking water. Pt advised on kick counts to do at home and to keep appt today with FRANDY at 11am. Pt verbalized understanding.

## 2024-03-18 NOTE — PROGRESS NOTES
Pt is a 33 year old female admitted to TR4/TR4-A.     Chief Complaint   Patient presents with    Decreased Fetal Movement     Pt has felt movement since leaving office and getting something to eat.    Non-stress Test      Pt is  36w6d intra-uterine pregnancy.  History obtained, consents signed. Oriented to room, staff, and plan of care.

## 2024-03-18 NOTE — PROGRESS NOTES
Feeling well. Had Dec FM this AM.  Came to office and spoke with RNs.  FHTs checked and pt left to go home.  Now with better mov't.  Will get NST for reassurance.  RTC 1 wk.

## 2024-03-18 NOTE — TELEPHONE ENCOUNTER
Faxed order for [employer] breast pump received on patients behalf from [employer]. Order filled out and faxed back with original sent to [facility]. Order placed in Epic.

## 2024-03-19 ENCOUNTER — TELEPHONE (OUTPATIENT)
Dept: OBGYN CLINIC | Facility: CLINIC | Age: 34
End: 2024-03-19

## 2024-03-19 ENCOUNTER — ANESTHESIA EVENT (OUTPATIENT)
Dept: OBGYN UNIT | Facility: HOSPITAL | Age: 34
End: 2024-03-19
Payer: COMMERCIAL

## 2024-03-19 ENCOUNTER — ANESTHESIA (OUTPATIENT)
Dept: OBGYN UNIT | Facility: HOSPITAL | Age: 34
End: 2024-03-19
Payer: COMMERCIAL

## 2024-03-19 ENCOUNTER — HOSPITAL ENCOUNTER (INPATIENT)
Facility: HOSPITAL | Age: 34
LOS: 2 days | Discharge: HOME | End: 2024-03-21
Attending: OBSTETRICS & GYNECOLOGY | Admitting: OBSTETRICS & GYNECOLOGY
Payer: COMMERCIAL

## 2024-03-19 PROBLEM — O42.90 AMNIOTIC FLUID LEAKING (HCC): Status: ACTIVE | Noted: 2024-03-19

## 2024-03-19 LAB
ANTIBODY SCREEN: NEGATIVE
BASOPHILS # BLD AUTO: 0.04 X10(3) UL (ref 0–0.2)
BASOPHILS NFR BLD AUTO: 0.4 %
DEPRECATED RDW RBC AUTO: 40.7 FL (ref 35.1–46.3)
EOSINOPHIL # BLD AUTO: 0.05 X10(3) UL (ref 0–0.7)
EOSINOPHIL NFR BLD AUTO: 0.5 %
ERYTHROCYTE [DISTWIDTH] IN BLOOD BY AUTOMATED COUNT: 12.5 % (ref 11–15)
HCT VFR BLD AUTO: 40.9 %
HGB BLD-MCNC: 13.6 G/DL
IMM GRANULOCYTES # BLD AUTO: 0.04 X10(3) UL (ref 0–1)
IMM GRANULOCYTES NFR BLD: 0.4 %
LYMPHOCYTES # BLD AUTO: 1.49 X10(3) UL (ref 1–4)
LYMPHOCYTES NFR BLD AUTO: 16.1 %
MCH RBC QN AUTO: 29.8 PG (ref 26–34)
MCHC RBC AUTO-ENTMCNC: 33.3 G/DL (ref 31–37)
MCV RBC AUTO: 89.5 FL
MONOCYTES # BLD AUTO: 0.66 X10(3) UL (ref 0.1–1)
MONOCYTES NFR BLD AUTO: 7.1 %
NEUTROPHILS # BLD AUTO: 6.96 X10 (3) UL (ref 1.5–7.7)
NEUTROPHILS # BLD AUTO: 6.96 X10(3) UL (ref 1.5–7.7)
NEUTROPHILS NFR BLD AUTO: 75.5 %
PLATELET # BLD AUTO: 180 10(3)UL (ref 150–450)
RBC # BLD AUTO: 4.57 X10(6)UL
RH BLOOD TYPE: POSITIVE
WBC # BLD AUTO: 9.2 X10(3) UL (ref 4–11)

## 2024-03-19 PROCEDURE — 59410 OBSTETRICAL CARE: CPT | Performed by: OBSTETRICS & GYNECOLOGY

## 2024-03-19 RX ORDER — BENZOCAINE/MENTHOL 6 MG-10 MG
1 LOZENGE MUCOUS MEMBRANE EVERY 6 HOURS PRN
Status: DISCONTINUED | OUTPATIENT
Start: 2024-03-19 | End: 2024-03-21

## 2024-03-19 RX ORDER — ACETAMINOPHEN 500 MG
1000 TABLET ORAL EVERY 6 HOURS PRN
Status: DISCONTINUED | OUTPATIENT
Start: 2024-03-19 | End: 2024-03-21

## 2024-03-19 RX ORDER — LIDOCAINE HYDROCHLORIDE 10 MG/ML
30 INJECTION, SOLUTION EPIDURAL; INFILTRATION; INTRACAUDAL; PERINEURAL ONCE
Status: COMPLETED | OUTPATIENT
Start: 2024-03-19 | End: 2024-03-19

## 2024-03-19 RX ORDER — TERBUTALINE SULFATE 1 MG/ML
0.25 INJECTION, SOLUTION SUBCUTANEOUS AS NEEDED
Status: DISCONTINUED | OUTPATIENT
Start: 2024-03-19 | End: 2024-03-20 | Stop reason: HOSPADM

## 2024-03-19 RX ORDER — AMMONIA INHALANTS 0.04 G/.3ML
0.3 INHALANT RESPIRATORY (INHALATION) AS NEEDED
Status: DISCONTINUED | OUTPATIENT
Start: 2024-03-19 | End: 2024-03-21

## 2024-03-19 RX ORDER — CITRIC ACID/SODIUM CITRATE 334-500MG
30 SOLUTION, ORAL ORAL AS NEEDED
Status: DISCONTINUED | OUTPATIENT
Start: 2024-03-19 | End: 2024-03-20 | Stop reason: HOSPADM

## 2024-03-19 RX ORDER — ONDANSETRON 2 MG/ML
4 INJECTION INTRAMUSCULAR; INTRAVENOUS EVERY 6 HOURS PRN
Status: DISCONTINUED | OUTPATIENT
Start: 2024-03-19 | End: 2024-03-21

## 2024-03-19 RX ORDER — ACETAMINOPHEN 500 MG
1000 TABLET ORAL EVERY 6 HOURS PRN
Status: DISCONTINUED | OUTPATIENT
Start: 2024-03-19 | End: 2024-03-19

## 2024-03-19 RX ORDER — DEXTROSE, SODIUM CHLORIDE, SODIUM LACTATE, POTASSIUM CHLORIDE, AND CALCIUM CHLORIDE 5; .6; .31; .03; .02 G/100ML; G/100ML; G/100ML; G/100ML; G/100ML
INJECTION, SOLUTION INTRAVENOUS CONTINUOUS
Status: DISCONTINUED | OUTPATIENT
Start: 2024-03-19 | End: 2024-03-20 | Stop reason: HOSPADM

## 2024-03-19 RX ORDER — BUPIVACAINE HYDROCHLORIDE 2.5 MG/ML
20 INJECTION, SOLUTION EPIDURAL; INFILTRATION; INTRACAUDAL ONCE
Status: DISCONTINUED | OUTPATIENT
Start: 2024-03-19 | End: 2024-03-20 | Stop reason: HOSPADM

## 2024-03-19 RX ORDER — BUPIVACAINE HCL/0.9 % NACL/PF 0.25 %
5 PLASTIC BAG, INJECTION (ML) EPIDURAL AS NEEDED
Status: DISCONTINUED | OUTPATIENT
Start: 2024-03-19 | End: 2024-03-21

## 2024-03-19 RX ORDER — ACETAMINOPHEN 500 MG
500 TABLET ORAL EVERY 6 HOURS PRN
Status: DISCONTINUED | OUTPATIENT
Start: 2024-03-19 | End: 2024-03-21

## 2024-03-19 RX ORDER — LIDOCAINE HYDROCHLORIDE 10 MG/ML
INJECTION, SOLUTION INFILTRATION; PERINEURAL
Status: COMPLETED | OUTPATIENT
Start: 2024-03-19 | End: 2024-03-19

## 2024-03-19 RX ORDER — ACETAMINOPHEN 500 MG
500 TABLET ORAL EVERY 6 HOURS PRN
Status: DISCONTINUED | OUTPATIENT
Start: 2024-03-19 | End: 2024-03-19

## 2024-03-19 RX ORDER — BISACODYL 10 MG
10 SUPPOSITORY, RECTAL RECTAL ONCE AS NEEDED
Status: DISCONTINUED | OUTPATIENT
Start: 2024-03-19 | End: 2024-03-21

## 2024-03-19 RX ORDER — DOCUSATE SODIUM 100 MG/1
100 CAPSULE, LIQUID FILLED ORAL
Status: DISCONTINUED | OUTPATIENT
Start: 2024-03-19 | End: 2024-03-21

## 2024-03-19 RX ORDER — SODIUM CHLORIDE, SODIUM LACTATE, POTASSIUM CHLORIDE, CALCIUM CHLORIDE 600; 310; 30; 20 MG/100ML; MG/100ML; MG/100ML; MG/100ML
INJECTION, SOLUTION INTRAVENOUS AS NEEDED
Status: DISCONTINUED | OUTPATIENT
Start: 2024-03-19 | End: 2024-03-20 | Stop reason: HOSPADM

## 2024-03-19 RX ORDER — SIMETHICONE 80 MG
80 TABLET,CHEWABLE ORAL 3 TIMES DAILY PRN
Status: DISCONTINUED | OUTPATIENT
Start: 2024-03-19 | End: 2024-03-21

## 2024-03-19 RX ORDER — LIDOCAINE HYDROCHLORIDE AND EPINEPHRINE 15; 5 MG/ML; UG/ML
INJECTION, SOLUTION EPIDURAL
Status: COMPLETED | OUTPATIENT
Start: 2024-03-19 | End: 2024-03-19

## 2024-03-19 RX ORDER — ONDANSETRON 2 MG/ML
4 INJECTION INTRAMUSCULAR; INTRAVENOUS EVERY 6 HOURS PRN
Status: DISCONTINUED | OUTPATIENT
Start: 2024-03-19 | End: 2024-03-19

## 2024-03-19 RX ORDER — IBUPROFEN 600 MG/1
600 TABLET ORAL EVERY 6 HOURS
Status: DISCONTINUED | OUTPATIENT
Start: 2024-03-19 | End: 2024-03-21

## 2024-03-19 RX ORDER — BUPIVACAINE HYDROCHLORIDE 2.5 MG/ML
INJECTION, SOLUTION EPIDURAL; INFILTRATION; INTRACAUDAL
Status: COMPLETED | OUTPATIENT
Start: 2024-03-19 | End: 2024-03-19

## 2024-03-19 RX ORDER — NALBUPHINE HYDROCHLORIDE 10 MG/ML
2.5 INJECTION, SOLUTION INTRAMUSCULAR; INTRAVENOUS; SUBCUTANEOUS
Status: DISCONTINUED | OUTPATIENT
Start: 2024-03-19 | End: 2024-03-21

## 2024-03-19 RX ORDER — IBUPROFEN 600 MG/1
600 TABLET ORAL ONCE AS NEEDED
Status: DISCONTINUED | OUTPATIENT
Start: 2024-03-19 | End: 2024-03-20 | Stop reason: HOSPADM

## 2024-03-19 RX ADMIN — LIDOCAINE HYDROCHLORIDE AND EPINEPHRINE 5 ML: 15; 5 INJECTION, SOLUTION EPIDURAL at 17:45:00

## 2024-03-19 RX ADMIN — BUPIVACAINE HYDROCHLORIDE 10 ML: 2.5 INJECTION, SOLUTION EPIDURAL; INFILTRATION; INTRACAUDAL at 17:45:00

## 2024-03-19 RX ADMIN — LIDOCAINE HYDROCHLORIDE 5 ML: 10 INJECTION, SOLUTION INFILTRATION; PERINEURAL at 17:38:00

## 2024-03-19 NOTE — ANESTHESIA PROCEDURE NOTES
Labor Analgesia    Date/Time: 3/19/2024 5:38 PM    Performed by: Martin Milligan MD  Authorized by: Martin Milligan MD      General Information and Staff    Start Time:  3/19/2024 5:38 PM  End Time:  3/19/2024 5:51 PM  Anesthesiologist:  Martin Milligan MD  Performed by:  Anesthesiologist  Patient Location:  OB  Reason for Block: labor epidural    Preanesthetic Checklist: patient identified, IV checked, site marked, risks and benefits discussed, monitors and equipment checked, pre-op evaluation, timeout performed, anesthesia consent and sterile technique used      Procedure Details    Patient Position:  Sitting  Prep: ChloraPrep    Monitoring:  Heart rate  Approach:  Midline    Epidural Needle    Injection Technique:  SAMSON air  Needle Type:  Tuohy  Needle Gauge:  18 G  Needle Length:  3.5 in  Location:  L3-4    Spinal Needle      Catheter    Catheter Type:  Multi-orifice  Catheter Size:  20 G  Test Dose:  Negative    Assessment      Additional Comments

## 2024-03-19 NOTE — TELEPHONE ENCOUNTER
Pt is 37w0d, calling to report having LOF. Pt reports around 2 am she noticed her underwear was wet. Pt states she thought maybe it was just her mucous plug, so she went back to sleep and woke up around 7am and noticed that her underwear was soaking wet. She went urinate and reports noticing a tint of blood in her urine. Pt placed a pad and states that when she stood up it felt like more was coming out. Pt states the fluid is clear and odorless.     Pt states she has mild 1/10 cramping in her lower abd/pelvic area. Pt reports having +FM (5 movements w/in the hr).    Pt advised to go to [facility] for further eval. Pt agreed. [facility] notified.     Message to TODD (on-call) for sign off.

## 2024-03-19 NOTE — ANESTHESIA PREPROCEDURE EVALUATION
Anesthesia PreOp Note    HPI:     Eliud Mckeon is a 33 year old female who presents for preoperative consultation requested by: * No surgeons listed *    Date of Surgery: 3/19/2024    * No procedures listed *  Indication: * No pre-op diagnosis entered *    Relevant Problems   No relevant active problems       NPO:                         History Review:  Patient Active Problem List    Diagnosis Date Noted    Amniotic fluid leaking (HCC) 03/19/2024    Positive Chlamydia PCR 09/27/2023    Cervical polyp 09/26/2023       History reviewed. No pertinent past medical history.    History reviewed. No pertinent surgical history.    Medications Prior to Admission   Medication Sig Dispense Refill Last Dose    prenatal vitamin with DHA 27-0.8-228 MG Oral Cap Take 1 capsule by mouth daily.   Past Week     Current Facility-Administered Medications Ordered in Epic   Medication Dose Route Frequency Provider Last Rate Last Admin    dextrose in lactated ringers 5% infusion   Intravenous Continuous Regine Simmons MD        lactated ringers infusion   Intravenous PRN Regine Simmons  mL/hr at 03/19/24 1451 New Bag at 03/19/24 1451    lactated ringers IV bolus 500 mL  500 mL Intravenous PRN Regine Simmons MD        acetaminophen (Tylenol Extra Strength) tab 500 mg  500 mg Oral Q6H PRN Regine Simmons MD        acetaminophen (Tylenol Extra Strength) tab 1,000 mg  1,000 mg Oral Q6H PRN Regine Simmons MD        ibuprofen (Motrin) tab 600 mg  600 mg Oral Once PRN Regine Simmons MD        ondansetron (Zofran) 4 MG/2ML injection 4 mg  4 mg Intravenous Q6H PRN Regine Simmons MD        oxyTOCIN in sodium chloride 0.9% (Pitocin) 30 Units/500mL infusion premix  62.5-900 omayra-units/min Intravenous Continuous Regine Simmons MD        terbutaline (Brethine) 1 MG/ML injection 0.25 mg  0.25 mg Subcutaneous PRN Regine Simmons MD        sodium citrate-citric acid (Bicitra) 500-334 MG/5ML oral solution 30 mL  30 mL Oral PRN  Regine Simmons MD        lidocaine PF (Xylocaine-MPF) 1% injection  30 mL Intradermal Once Regine Simmons MD        fentaNYL (Sublimaze) 50 mcg/mL injection 50 mcg  50 mcg Intravenous Q30 Min PRN Regine Simmons MD        oxyTOCIN in sodium chloride 0.9% (Pitocin) 30 Units/500mL infusion premix  0.5-20 omayra-units/min Intravenous Continuous Regine Simmons MD 6 mL/hr at 03/19/24 1530 6 omayra-units/min at 03/19/24 1530    fentaNYL-bupivacaine 2 mcg/mL-0.125% in sodium chloride 0.9% 100 mL EPIDURAL infusion premix   Epidural Continuous Alecia Augustine MD        fentaNYL (Sublimaze) 50 mcg/mL injection 100 mcg  100 mcg Epidural Once Alecia Augustine MD        bupivacaine PF (Marcaine) 0.25% injection  20 mL Epidural Once Alecia Augustine MD        EPHEDrine (PF) 25 MG/5 ML injection 5 mg  5 mg Intravenous PRN Alecia Augustine MD        nalbuphine (Nubain) 10 mg/mL injection 2.5 mg  2.5 mg Intravenous Q15 Min PRN Alecia Augustine MD         No current Epic-ordered outpatient medications on file.       No Known Allergies    No family history on file.  Social History     Socioeconomic History    Marital status:    Tobacco Use    Smoking status: Never    Smokeless tobacco: Never   Vaping Use    Vaping Use: Never used   Substance and Sexual Activity    Drug use: Never       Available pre-op labs reviewed.  Lab Results   Component Value Date    WBC 9.2 03/19/2024    RBC 4.57 03/19/2024    HGB 13.6 03/19/2024    HCT 40.9 03/19/2024    MCV 89.5 03/19/2024    MCH 29.8 03/19/2024    MCHC 33.3 03/19/2024    RDW 12.5 03/19/2024    .0 03/19/2024             Vital Signs:  There is no height or weight on file to calculate BMI.   oral temperature is 97.9 °F (36.6 °C). Her blood pressure is 123/88 and her pulse is 88.   Vitals:    03/19/24 1315 03/19/24 1745   BP: 123/88    Pulse: 88    Temp: 98.3 °F (36.8 °C) 97.9 °F (36.6 °C)   TempSrc: Oral Oral        Anesthesia Evaluation     Patient summary reviewed and Nursing notes reviewed     No history of anesthetic complications   Airway   Mallampati: II  Neck ROM: full  Dental      Pulmonary - negative ROS and normal exam   Cardiovascular - negative ROS and normal exam    Neuro/Psych - negative ROS     GI/Hepatic/Renal - negative ROS     Endo/Other - negative ROS   Abdominal  - normal exam                 Anesthesia Plan:   ASA:  2  Plan:   Epidural  Informed Consent Plan and Risks Discussed With:  Patient      I have informed Azmarcin Zorigt and/or legal guardian or family member of the nature of the anesthetic plan, benefits, risks including possible dental damage if relevant, major complications, and any alternative forms of anesthetic management.   All of the patient's questions were answered to the best of my ability. The patient desires the anesthetic management as planned.  PINEDA WALTON MD  3/19/2024 5:52 PM  Present on Admission:  **None**

## 2024-03-19 NOTE — H&P
[facility]  part of [facility]    History & Physical    Eliud Mckeon Patient Status:  Outpatient    1990 MRN [id number]   Location [facility] Attending Regine Simmons MD   Hosp Day # 0 PCP No primary care provider on file.     Date of Admission:  3/19/2024    SUBJECTIVE:    Eliud Mckeon is a 33 year old  at 37w0d with 2024, by Last Menstrual Period who is being admitted for labor management. Patient reports leaking of clear fluid since 0200  .   Fetal Movement: normal.     Problem List:   Patient Active Problem List    Diagnosis    Amniotic fluid leaking (HCC)    Positive Chlamydia PCR     SALOME collected 11/15/23 - Neg  Positive 23  Needs SALOME 4 weeks      Cervical polyp     Noted on initial OB exam; 1.5 cm       Obstetric History:   OB History    Para Term  AB Living   2 1 1     1   SAB IAB Ectopic Multiple Live Births           1      # Outcome Date GA Lbr Bob/2nd Weight Sex Delivery Anes PTL Lv   2 Current            1 Term 13 38w3d  7 lb (3.175 kg) M NORMAL SPONT   OLEG     Past Medical History: History reviewed. No pertinent past medical history.  Past Social History: History reviewed. No pertinent surgical history.  Family History: No family history on file.  Social History:   Social History     Tobacco Use    Smoking status: Never    Smokeless tobacco: Never   Substance Use Topics    Alcohol use: Not on file       Home Meds:   Medications Prior to Admission   Medication Sig Dispense Refill Last Dose    prenatal vitamin with DHA 27-0.8-228 MG Oral Cap Take 1 capsule by mouth daily.        Allergies: No Known Allergies    OBJECTIVE:    Temp:  [98.3 °F (36.8 °C)] 98.3 °F (36.8 °C)  Pulse:  [88] 88  BP: (123)/(88) 123/88    General: Alert and Oriented  Abdomen: Soft, Gravid  Leopolds: vertex    Cervix 2/80/-2; AROM of forebag performed       130 BPM, Fetal heart variability: moderate and reactive  Decelerations:  No  Contractions: irregular    Lab Review:  Results for orders placed or performed during the hospital encounter of 24   CBC W/ DIFFERENTIAL   Result Value Ref Range    WBC 9.2 4.0 - 11.0 x10(3) uL    RBC 4.57 3.80 - 5.30 x10(6)uL    HGB 13.6 12.0 - 16.0 g/dL    HCT 40.9 35.0 - 48.0 %    MCV 89.5 80.0 - 100.0 fL    MCH 29.8 26.0 - 34.0 pg    MCHC 33.3 31.0 - 37.0 g/dL    RDW-SD 40.7 35.1 - 46.3 fL    RDW 12.5 11.0 - 15.0 %    .0 150.0 - 450.0 10(3)uL    Neutrophil Absolute Prelim 6.96 1.50 - 7.70 x10 (3) uL    Neutrophil Absolute 6.96 1.50 - 7.70 x10(3) uL    Lymphocyte Absolute 1.49 1.00 - 4.00 x10(3) uL    Monocyte Absolute 0.66 0.10 - 1.00 x10(3) uL    Eosinophil Absolute 0.05 0.00 - 0.70 x10(3) uL    Basophil Absolute 0.04 0.00 - 0.20 x10(3) uL    Immature Granulocyte Absolute 0.04 0.00 - 1.00 x10(3) uL    Neutrophil % 75.5 %    Lymphocyte % 16.1 %    Monocyte % 7.1 %    Eosinophil % 0.5 %    Basophil % 0.4 %    Immature Granulocyte % 0.4 %        ASSESSMENT:    Patient is a  at 37w0d  Patient Active Problem List   Diagnosis    Cervical polyp    Positive Chlamydia PCR    Amniotic fluid leaking (HCC)         PLAN:    Admit  Cefm/toco  FHTs reassuring   GBS negative  Pitocin per protocol 2      Regine Simmons MD  3/19/2024  3:18 PM

## 2024-03-19 NOTE — PROGRESS NOTES
Pt is a 33 year old female admitted to TR1/TR1-A.     Chief Complaint   Patient presents with    R/o Rom      Pt is  37w0d intra-uterine pregnancy.  History obtained, consents signed. Oriented to room, staff, and plan of care.

## 2024-03-20 LAB
BASOPHILS # BLD AUTO: 0.04 X10(3) UL (ref 0–0.2)
BASOPHILS NFR BLD AUTO: 0.3 %
DEPRECATED RDW RBC AUTO: 40.7 FL (ref 35.1–46.3)
EOSINOPHIL # BLD AUTO: 0.11 X10(3) UL (ref 0–0.7)
EOSINOPHIL NFR BLD AUTO: 0.7 %
ERYTHROCYTE [DISTWIDTH] IN BLOOD BY AUTOMATED COUNT: 12.5 % (ref 11–15)
HCT VFR BLD AUTO: 33.1 %
HGB BLD-MCNC: 11.2 G/DL
IMM GRANULOCYTES # BLD AUTO: 0.06 X10(3) UL (ref 0–1)
IMM GRANULOCYTES NFR BLD: 0.4 %
LYMPHOCYTES # BLD AUTO: 1.96 X10(3) UL (ref 1–4)
LYMPHOCYTES NFR BLD AUTO: 13 %
MCH RBC QN AUTO: 30.6 PG (ref 26–34)
MCHC RBC AUTO-ENTMCNC: 33.8 G/DL (ref 31–37)
MCV RBC AUTO: 90.4 FL
MONOCYTES # BLD AUTO: 1.13 X10(3) UL (ref 0.1–1)
MONOCYTES NFR BLD AUTO: 7.5 %
NEUTROPHILS # BLD AUTO: 11.8 X10 (3) UL (ref 1.5–7.7)
NEUTROPHILS # BLD AUTO: 11.8 X10(3) UL (ref 1.5–7.7)
NEUTROPHILS NFR BLD AUTO: 78.1 %
PLATELET # BLD AUTO: 139 10(3)UL (ref 150–450)
RBC # BLD AUTO: 3.66 X10(6)UL
WBC # BLD AUTO: 15.1 X10(3) UL (ref 4–11)

## 2024-03-20 NOTE — ANESTHESIA POSTPROCEDURE EVALUATION
Patient: Eliud Mckeon    Procedure Summary       Date: 03/19/24 Room / Location:     Anesthesia Start: 1738 Anesthesia Stop: 2056    Procedure: LABOR ANALGESIA Diagnosis:     Scheduled Providers:  Anesthesiologist: Alecia Guevara MD    Anesthesia Type: epidural ASA Status: 2            Anesthesia Type: epidural    Vitals Value Taken Time   /75 03/19/24 2146   Temp  03/20/24 0606   Pulse 88 03/19/24 2146   Resp  03/20/24 0606   SpO2 100 % 03/19/24 2100       [facility] AN Post Evaluation:   Patient Evaluated in floor  Patient Participation: complete - patient participated  Level of Consciousness: awake and alert  Pain Score: 0  Pain Management: adequate  Airway Patency:patent  Dental exam unchanged from preop    Comments: NO HA no BA no new neurologic signs  or symptoms  Site is clean dry intact      ALECIA GUEVARA MD  3/20/2024 6:06 AM

## 2024-03-20 NOTE — PLAN OF CARE
Problem: BIRTH - VAGINAL/ SECTION  Goal: Fetal and maternal status remain reassuring during the birth process  Description: INTERVENTIONS:  - Monitor vital signs  - Monitor fetal heart rate  - Monitor uterine activity  - Monitor labor progression (vaginal delivery)  - DVT prophylaxis (C/S delivery)  - Surgical antibiotic prophylaxis (C/S delivery)  Outcome: Completed     Problem: PAIN - ADULT  Goal: Verbalizes/displays adequate comfort level or patient's stated pain goal  Description: INTERVENTIONS:  - Encourage pt to monitor pain and request assistance  - Assess pain using appropriate pain scale  - Administer analgesics based on type and severity of pain and evaluate response  - Implement non-pharmacological measures as appropriate and evaluate response  - Consider cultural and social influences on pain and pain management  - Manage/alleviate anxiety  - Utilize distraction and/or relaxation techniques  - Monitor for opioid side effects  - Notify MD/LIP if interventions unsuccessful or patient reports new pain  - Anticipate increased pain with activity and pre-medicate as appropriate  Outcome: Progressing     Problem: ANXIETY  Goal: Will report anxiety at manageable levels  Description: INTERVENTIONS:  - Administer medication as ordered  - Teach and rehearse alternative coping skills  - Provide emotional support with 1:1 interaction with staff  Outcome: Progressing     Problem: POSTPARTUM  Goal: Long Term Goal:Experiences normal postpartum course  Description: INTERVENTIONS:  - Assess and monitor vital signs and lab values.  - Assess fundus and lochia.  - Provide ice/sitz baths for perineum discomfort.  - Monitor healing of incision/episiotomy/laceration, and assess for signs and symptoms of infection and hematoma.  - Assess bladder function and monitor for bladder distention.  - Provide/instruct/assist with pericare as needed.  - Provide VTE prophylaxis as needed.  - Monitor bowel function.  - Encourage  ambulation and provide assistance as needed.  - Assess and monitor emotional status and provide social service/psych resources as needed.  - Utilize standard precautions and use personal protective equipment as indicated. Ensure aseptic care of all intravenous lines and invasive tubes/drains.  - Obtain immunization and exposure to communicable diseases history.  Outcome: Progressing  Goal: Optimize infant feeding at the breast  Description: INTERVENTIONS:  - Initiate breast feeding within first hour after birth.   - Monitor effectiveness of current breast feeding efforts.  - Assess support systems available to mother/family.  - Identify cultural beliefs/practices regarding lactation, letdown techniques, maternal food preferences.  - Assess mother's knowledge and previous experience with breast feeding.  - Provide information as needed about early infant feeding cues (e.g., rooting, lip smacking, sucking fingers/hand) versus late cue of crying.  - Discuss/demonstrate breast feeding aids (e.g., infant sling, nursing footstool/pillows, and breast pumps).  - Encourage mother/other family members to express feelings/concerns, and actively listen.  - Educate father/SO about benefits of breast feeding and how to manage common lactation challenges.  - Recommend avoidance of specific medications or substances incompatible with breast feeding.  - Assess and monitor for signs of nipple pain/trauma.  - Instruct and provide assistance with proper latch.  - Review techniques for milk expression (breast pumping) and storage of breast milk. Provide pumping equipment/supplies, instructions and assistance, as needed.  - Encourage rooming-in and breast feeding on demand.  - Encourage skin-to-skin contact.  - Provide LC support as needed.  - Assess for and manage engorgement.  - Provide breast feeding education handouts and information on community breast feeding support.   Outcome: Progressing  Goal: Establishment of adequate milk  supply with medication/procedure interruptions  Description: INTERVENTIONS:  - Review techniques for milk expression (breast pumping).   - Provide pumping equipment/supplies, instructions, and assistance until it is safe to breastfeed infant.  Outcome: Progressing  Goal: Experiences normal breast weaning course  Description: INTERVENTIONS:  - Assess for and manage engorgement.  - Instruct on breast care.  - Provide comfort measures.  Outcome: Progressing  Goal: Appropriate maternal -  bonding  Description: INTERVENTIONS:  - Assess caregiver- interactions.  - Assess caregiver's emotional status and coping mechanisms.  - Encourage caregiver to participate in  daily care.  - Assess support systems available to mother/family.  - Provide /case management support as needed.  Outcome: Progressing     Problem: GENITOURINARY - ADULT  Goal: Absence of urinary retention  Description: INTERVENTIONS:  - Assess patient’s ability to void and empty bladder  - Monitor intake/output and perform bladder scan as needed  - Follow urinary retention protocol/standard of care  - Consider collaborating with pharmacy to review patient's medication profile  - Implement strategies to promote bladder emptying  Outcome: Progressing

## 2024-03-20 NOTE — LACTATION NOTE
03/20/24 1630   Evaluation Type   Evaluation Type Inpatient   Problems identified   Problems identified Knowledge deficit;Milk supply not WNL   Milk supply not WNL Reduced (potential)   Breastfeeding goal   Breastfeeding goal To maintain breast milk feeding per patient goal   Maternal Assessment   Bilateral Breasts Wide spaced;Tubular shaped;Soft   Bilateral Nipples Slightly everted/short;Colostrum difficult to express   Prior breastfeeding experience (comment below) Multip;Unsuccessful   Prior BF experience: comment Tried very little at first and feels that she gave up quickly   Breastfeeding Assistance Breastfeeding assistance provided with permission;Pumping assistance provided with permission;Breast exam provided with permission;Hand expression provided with permission   Pain assessment   Location/Comment denies   Guidelines for use of:   Breast pump type Hand Pump;Medela Pump In Style MaxFlow   Suggested use of pump Pump each time a supplement is offered;Pump if infant is not latching to breast   Other (comment) Assisted with a latch in football hold to the left breast, was able to attain a deep latch, nursed for about 5 minutes, pulled off, and then was able to latch to the right side.  Infant popped off a few times and mom was able to relatch the baby to the breast.  Discussed the handbook, skin to skin, hand massage and expression, mom not able to express breast milk, encouraged to call if needed.

## 2024-03-20 NOTE — LACTATION NOTE
This note was copied from a baby's chart.     03/20/24 2079   Evaluation Type   Evaluation Type Inpatient   Problems & Assessment   Problems Diagnosed or Identified Latch difficulty   Infant Assessment Hunger cues present   Muscle tone Appropriate for GA   Feeding Assessment   Summary Current Feeding Breastfeeding with formula supplement   Breastfeeding Assessment Assisted with breastfeeding w/mother's permission;Calm and ready to breastfeed;Coordinated suck/swallow;Tolerated feeding well;Deep latch achieved and observed   Breastfeeding lasted # of minutes 10  (still nursing)   Breastfeeding Positions football;right breast;left breast   Latch 1   Audible Sucks/Swallows 2   Type of Nipple 2   Comfort (Breast/Nipple) 2   Hold (Positioning) 1   LATCH Score 8   Other (comment) Assisted with a latch in football hold to the left breast, was able to attain a deep latch, nursed for about 5 minutes, pulled off, and then was able to latch to the right side. Infant popped off a few times and mom was able to relatch the baby to the breast. Discussed the handbook, skin to skin, hand massage and expression, mom not able to express breast milk, encouraged to call if needed.

## 2024-03-20 NOTE — L&D DELIVERY NOTE
[facility]  part of [facility]    Vaginal Delivery Note    Eliud Mckeon Patient Status:  Outpatient    1990 MRN [id number]   Location [facility] Attending Regine Simmons MD   Hosp Day # 0 PCP No primary care provider on file.     Delivery     Infant  Date of Delivery: 3/19/2024    Time of Delivery: 8:51 PM   Delivery Type: Vaginal, Vacuum (Extractor)     Infant Sex  Information for the patient's :  Chauncey Mckeon [[id number]]   male     Infant Birthweight  Information for the patient's :  Chauncey Mckeon [[id number]]   No birth weight on file.      Presentation Vertex [1]   Position   Occiput [1]  Anterior [1]     Apgars:  1 minute: 9                5 minutes: 9                      Neonatologist Present: no      Placenta  Date/Time of Delivery: 3/19/2024  8:56 PM    Delivery: spontaneous  Placenta to Pathology: no      Cord Gases Submitted: no  Cord Complications: single nuchal    Maternal Anesthesia: epidural     Episiotomy/Laceration Repair  First degree perineal laceration  Cervical polyp noted and swollen    Delivery Complications  Vacuum Assistance: indication for: non-reassuring fetal status.  Verbal consent obtained. Station of fetal vertex is outlet (head at perineum). Bladder emptied: yes.  Type of vacuum used was a Kiwi OmniCup. Maximum Pressure in the green zone. Number of pulls 3. Number of pop offs none. Minutes applied to fetal scalp 10 seconds. Pressure applied only with contration yes.  scalp injuries: none.  Additional comments:  .    EBL:  150 cc    Delivery Comment:     Baby OA. Shoulders delivered atraumatically followed by the trunk and LE. Nasopharyngeally bulb suctioned on mother's chest and infant vigorous. Cord clamped and cut after 60 seconds delay. Baby handed to the awaiting nursing personal. Placenta delivered by controlled cord traction intact with a 3 VC and intact. Normal uterine tone with oxytocin infusion.  Repair as above with good hemostasis and anatomic re approximation. Sphincter intact.  Rectum and vagina clear of sponges. Deeth and patient stable in the delivery room with nursing present. Sponge and needle counts verified.        Regine Simmons MD   3/19/2024  9:16 PM

## 2024-03-20 NOTE — PROGRESS NOTES
Patient up to bathroom with assist x 2.  Unable to void at this time. Patient transferred to mother/baby room 349 per wheelchair in stable condition with baby and personal belongings.  Accompanied by significant other and staff.  Report given to mother/baby RN.

## 2024-03-20 NOTE — PROGRESS NOTES
Patient received into room 349 via wheel chair. Beside report received from L&D RN Laura. Patient transferred to bed without incident. Bed in locked and low position. Side rails up x 2. VSS. Fundus firm at u/u, lochia small, no clots noted. IV site unremarkable. Baby boy present at bedside in open crib, ID bands checked and verified. Patient/family oriented to unit, room and call light. Call light within patient reach. Reinforced to patient to call for assistance before getting out of bed to bathroom. Plan of care reviewed.

## 2024-03-20 NOTE — DISCHARGE SUMMARY
[facility]  part of [facility]    Discharge Summary    Eliud Mckeon Patient Status:  Outpatient    1990 MRN [id number]   Location [facility] Attending Regine Simmons MD   Hosp Day # 0       Admit date:  3/19/2024    Discharge date: 3/21/24    Delivering OB Clinician: Regine Simmons MD    EDC: Estimated Date of Delivery: 24    Gestational Age: 37w0d    Antepartum complications:   Patient Active Problem List   Diagnosis    Cervical polyp    Positive Chlamydia PCR    Amniotic fluid leaking (HCC)       Date of Delivery: 3/19/2024  Time of Delivery: 8:51 PM     Delivery Type: vacuum, outlet    Baby: Liveborn male (Milo)  Information for the patient's :  Chauncey Mckeon [[id number]]   No birth weight on file.   Apgars:  1 minute: 9   5 minutes: 9 10 minutes:       Intrapartum Complications: Non-reassuring Fetal Status      Hospital Course: Presented with SROM. Pitocin augmentation and epidural. Fetal decelerations with pushing; vacuum delivery performed. No complications. Routine delivery and postpartum care  Patient Vitals for the past 36 hrs:   Dilation Effacement (%) Station Presentation Method OB Examiner Station (Labor Curve)   24 1814 4 90 -1 Vertex Manual Yulia PADILLA 6 cm   24 1514 2 80 -2 Vertex Manual Troy QUINTEROS 7 cm        Discharge Physical Exam:   /75   Pulse 80   Temp 97.9 °F (36.6 °C) (Oral)   LMP 2023 (Exact Date)   SpO2 100%   General appearance:  alert, appears stated age, cooperative and no distress  Abdominal: soft, non-tender, no rebound  Uterus: firm, nontender, below umbilicus  Pelvic: deferred  Extremities: Homans sign is negative, no sign of DVT    Discharged Condition: stable    Disposition: home    Plan:     Follow-up appointment in 6 weeks with Dr. Simmons

## 2024-03-20 NOTE — PLAN OF CARE
Problem: BIRTH - VAGINAL/ SECTION  Goal: Fetal and maternal status remain reassuring during the birth process  Description: INTERVENTIONS:  - Monitor vital signs  - Monitor fetal heart rate  - Monitor uterine activity  - Monitor labor progression (vaginal delivery)  - DVT prophylaxis (C/S delivery)  - Surgical antibiotic prophylaxis (C/S delivery)  Outcome: Progressing     Problem: PAIN - ADULT  Goal: Verbalizes/displays adequate comfort level or patient's stated pain goal  Description: INTERVENTIONS:  - Encourage pt to monitor pain and request assistance  - Assess pain using appropriate pain scale  - Administer analgesics based on type and severity of pain and evaluate response  - Implement non-pharmacological measures as appropriate and evaluate response  - Consider cultural and social influences on pain and pain management  - Manage/alleviate anxiety  - Utilize distraction and/or relaxation techniques  - Monitor for opioid side effects  - Notify MD/LIP if interventions unsuccessful or patient reports new pain  - Anticipate increased pain with activity and pre-medicate as appropriate  Outcome: Progressing     Problem: ANXIETY  Goal: Will report anxiety at manageable levels  Description: INTERVENTIONS:  - Administer medication as ordered  - Teach and rehearse alternative coping skills  - Provide emotional support with 1:1 interaction with staff  Outcome: Progressing

## 2024-03-20 NOTE — PLAN OF CARE
Problem: PAIN - ADULT  Goal: Verbalizes/displays adequate comfort level or patient's stated pain goal  Description: INTERVENTIONS:  - Encourage pt to monitor pain and request assistance  - Assess pain using appropriate pain scale  - Administer analgesics based on type and severity of pain and evaluate response  - Implement non-pharmacological measures as appropriate and evaluate response  - Consider cultural and social influences on pain and pain management  - Manage/alleviate anxiety  - Utilize distraction and/or relaxation techniques  - Monitor for opioid side effects  - Notify MD/LIP if interventions unsuccessful or patient reports new pain  - Anticipate increased pain with activity and pre-medicate as appropriate  Outcome: Progressing     Problem: POSTPARTUM  Goal: Long Term Goal:Experiences normal postpartum course  Description: INTERVENTIONS:  - Assess and monitor vital signs and lab values.  - Assess fundus and lochia.  - Provide ice/sitz baths for perineum discomfort.  - Monitor healing of incision/episiotomy/laceration, and assess for signs and symptoms of infection and hematoma.  - Assess bladder function and monitor for bladder distention.  - Provide/instruct/assist with pericare as needed.  - Provide VTE prophylaxis as needed.  - Monitor bowel function.  - Encourage ambulation and provide assistance as needed.  - Assess and monitor emotional status and provide social service/psych resources as needed.  - Utilize standard precautions and use personal protective equipment as indicated. Ensure aseptic care of all intravenous lines and invasive tubes/drains.  - Obtain immunization and exposure to communicable diseases history.  Outcome: Progressing  Goal: Optimize infant feeding at the breast  Description: INTERVENTIONS:  - Initiate breast feeding within first hour after birth.   - Monitor effectiveness of current breast feeding efforts.  - Assess support systems available to mother/family.  - Identify  cultural beliefs/practices regarding lactation, letdown techniques, maternal food preferences.  - Assess mother's knowledge and previous experience with breast feeding.  - Provide information as needed about early infant feeding cues (e.g., rooting, lip smacking, sucking fingers/hand) versus late cue of crying.  - Discuss/demonstrate breast feeding aids (e.g., infant sling, nursing footstool/pillows, and breast pumps).  - Encourage mother/other family members to express feelings/concerns, and actively listen.  - Educate father/SO about benefits of breast feeding and how to manage common lactation challenges.  - Recommend avoidance of specific medications or substances incompatible with breast feeding.  - Assess and monitor for signs of nipple pain/trauma.  - Instruct and provide assistance with proper latch.  - Review techniques for milk expression (breast pumping) and storage of breast milk. Provide pumping equipment/supplies, instructions and assistance, as needed.  - Encourage rooming-in and breast feeding on demand.  - Encourage skin-to-skin contact.  - Provide LC support as needed.  - Assess for and manage engorgement.  - Provide breast feeding education handouts and information on community breast feeding support.   Outcome: Progressing  Goal: Establishment of adequate milk supply with medication/procedure interruptions  Description: INTERVENTIONS:  - Review techniques for milk expression (breast pumping).   - Provide pumping equipment/supplies, instructions, and assistance until it is safe to breastfeed infant.  Outcome: Progressing  Goal: Experiences normal breast weaning course  Description: INTERVENTIONS:  - Assess for and manage engorgement.  - Instruct on breast care.  - Provide comfort measures.  Outcome: Progressing  Goal: Appropriate maternal -  bonding  Description: INTERVENTIONS:  - Assess caregiver- interactions.  - Assess caregiver's emotional status and coping mechanisms.  -  Encourage caregiver to participate in  daily care.  - Assess support systems available to mother/family.  - Provide /case management support as needed.  Outcome: Progressing

## 2024-03-20 NOTE — PROGRESS NOTES
[facility]  part of [facility]    OB/Gyne Post  Progress Note      Eliud Mckeon Patient Status:  Inpatient    1990 MRN [id number]   Location [facility] 3SE Attending Regine Simmons MD   Hosp Day # 1 PCP No primary care provider on file.       Subjective     Good pain control. Tolerating present diet. Ambulating well. Voiding freely.  She denies headache, fever, chest pain, shortness of breath, dizziness upon ambulation nor leg pain.     Objective   Vital signs in last 24 hours:  Temp:  [97.9 °F (36.6 °C)-98.3 °F (36.8 °C)] 98.1 °F (36.7 °C)  Pulse:  [63-93] 63  Resp:  [18-20] 18  BP: ()/() 115/80  SpO2:  [99 %-100 %] 100 %    Input/Output:    Intake/Output Summary (Last 24 hours) at 3/20/2024 0854  Last data filed at 3/20/2024 0611  Gross per 24 hour   Intake --   Output 2200 ml   Net -2200 ml       AVSS  Constitutional: comfortable  Abdomen: soft nontender  Uterus: fundus below umbilicus, non tender  Extremities: No calf tenderness, SCDs on while in bed, No pitting edema.      Results:   Labs / Path / Radiology:    Recent Results (from the past 24 hour(s))   ABORH (Blood Type)    Collection Time: 24  2:22 PM   Result Value Ref Range    ABO BLOOD TYPE O     RH BLOOD TYPE Positive    Antibody Screen    Collection Time: 24  2:22 PM   Result Value Ref Range    Antibody Screen Negative    CBC W/ DIFFERENTIAL    Collection Time: 24  2:22 PM   Result Value Ref Range    WBC 9.2 4.0 - 11.0 x10(3) uL    RBC 4.57 3.80 - 5.30 x10(6)uL    HGB 13.6 12.0 - 16.0 g/dL    HCT 40.9 35.0 - 48.0 %    MCV 89.5 80.0 - 100.0 fL    MCH 29.8 26.0 - 34.0 pg    MCHC 33.3 31.0 - 37.0 g/dL    RDW-SD 40.7 35.1 - 46.3 fL    RDW 12.5 11.0 - 15.0 %    .0 150.0 - 450.0 10(3)uL    Neutrophil Absolute Prelim 6.96 1.50 - 7.70 x10 (3) uL    Neutrophil Absolute 6.96 1.50 - 7.70 x10(3) uL    Lymphocyte Absolute 1.49 1.00 - 4.00 x10(3) uL    Monocyte Absolute 0.66 0.10 - 1.00  x10(3) uL    Eosinophil Absolute 0.05 0.00 - 0.70 x10(3) uL    Basophil Absolute 0.04 0.00 - 0.20 x10(3) uL    Immature Granulocyte Absolute 0.04 0.00 - 1.00 x10(3) uL    Neutrophil % 75.5 %    Lymphocyte % 16.1 %    Monocyte % 7.1 %    Eosinophil % 0.5 %    Basophil % 0.4 %    Immature Granulocyte % 0.4 %   CBC W/ DIFFERENTIAL    Collection Time: 24  6:45 AM   Result Value Ref Range    WBC 15.1 (H) 4.0 - 11.0 x10(3) uL    RBC 3.66 (L) 3.80 - 5.30 x10(6)uL    HGB 11.2 (L) 12.0 - 16.0 g/dL    HCT 33.1 (L) 35.0 - 48.0 %    MCV 90.4 80.0 - 100.0 fL    MCH 30.6 26.0 - 34.0 pg    MCHC 33.8 31.0 - 37.0 g/dL    RDW-SD 40.7 35.1 - 46.3 fL    RDW 12.5 11.0 - 15.0 %    .0 (L) 150.0 - 450.0 10(3)uL    Neutrophil Absolute Prelim 11.80 (H) 1.50 - 7.70 x10 (3) uL    Neutrophil Absolute 11.80 (H) 1.50 - 7.70 x10(3) uL    Lymphocyte Absolute 1.96 1.00 - 4.00 x10(3) uL    Monocyte Absolute 1.13 (H) 0.10 - 1.00 x10(3) uL    Eosinophil Absolute 0.11 0.00 - 0.70 x10(3) uL    Basophil Absolute 0.04 0.00 - 0.20 x10(3) uL    Immature Granulocyte Absolute 0.06 0.00 - 1.00 x10(3) uL    Neutrophil % 78.1 %    Lymphocyte % 13.0 %    Monocyte % 7.5 %    Eosinophil % 0.7 %    Basophil % 0.3 %    Immature Granulocyte % 0.4 %       Specimens (From admission, onward)      None            No results found.      Assessment/Plan   33 year oldyo  , s/p spontaneous vaginal, PPD# 1     Patient Active Problem List   Diagnosis    Cervical polyp    Positive Chlamydia PCR    Amniotic fluid leaking (HCC)   .    ambulate, continue routine postpartum care      The patient had a male infant, and does not desire circumcision.    Jessica Pate MD  3/20/2024  8:54 AM

## 2024-03-21 VITALS
OXYGEN SATURATION: 100 % | HEART RATE: 70 BPM | SYSTOLIC BLOOD PRESSURE: 106 MMHG | RESPIRATION RATE: 16 BRPM | TEMPERATURE: 98 F | DIASTOLIC BLOOD PRESSURE: 65 MMHG

## 2024-03-21 NOTE — PLAN OF CARE
Problem: PAIN - ADULT  Goal: Verbalizes/displays adequate comfort level or patient's stated pain goal  Description: INTERVENTIONS:  - Encourage pt to monitor pain and request assistance  - Assess pain using appropriate pain scale  - Administer analgesics based on type and severity of pain and evaluate response  - Implement non-pharmacological measures as appropriate and evaluate response  - Consider cultural and social influences on pain and pain management  - Manage/alleviate anxiety  - Utilize distraction and/or relaxation techniques  - Monitor for opioid side effects  - Notify MD/LIP if interventions unsuccessful or patient reports new pain  - Anticipate increased pain with activity and pre-medicate as appropriate  Outcome: Progressing     Problem: POSTPARTUM  Goal: Long Term Goal:Experiences normal postpartum course  Description: INTERVENTIONS:  - Assess and monitor vital signs and lab values.  - Assess fundus and lochia.  - Provide ice/sitz baths for perineum discomfort.  - Monitor healing of incision/episiotomy/laceration, and assess for signs and symptoms of infection and hematoma.  - Assess bladder function and monitor for bladder distention.  - Provide/instruct/assist with pericare as needed.  - Provide VTE prophylaxis as needed.  - Monitor bowel function.  - Encourage ambulation and provide assistance as needed.  - Assess and monitor emotional status and provide social service/psych resources as needed.  - Utilize standard precautions and use personal protective equipment as indicated. Ensure aseptic care of all intravenous lines and invasive tubes/drains.  - Obtain immunization and exposure to communicable diseases history.  Outcome: Progressing  Goal: Optimize infant feeding at the breast  Description: INTERVENTIONS:  - Initiate breast feeding within first hour after birth.   - Monitor effectiveness of current breast feeding efforts.  - Assess support systems available to mother/family.  - Identify  cultural beliefs/practices regarding lactation, letdown techniques, maternal food preferences.  - Assess mother's knowledge and previous experience with breast feeding.  - Provide information as needed about early infant feeding cues (e.g., rooting, lip smacking, sucking fingers/hand) versus late cue of crying.  - Discuss/demonstrate breast feeding aids (e.g., infant sling, nursing footstool/pillows, and breast pumps).  - Encourage mother/other family members to express feelings/concerns, and actively listen.  - Educate father/SO about benefits of breast feeding and how to manage common lactation challenges.  - Recommend avoidance of specific medications or substances incompatible with breast feeding.  - Assess and monitor for signs of nipple pain/trauma.  - Instruct and provide assistance with proper latch.  - Review techniques for milk expression (breast pumping) and storage of breast milk. Provide pumping equipment/supplies, instructions and assistance, as needed.  - Encourage rooming-in and breast feeding on demand.  - Encourage skin-to-skin contact.  - Provide LC support as needed.  - Assess for and manage engorgement.  - Provide breast feeding education handouts and information on community breast feeding support.   Outcome: Progressing  Goal: Establishment of adequate milk supply with medication/procedure interruptions  Description: INTERVENTIONS:  - Review techniques for milk expression (breast pumping).   - Provide pumping equipment/supplies, instructions, and assistance until it is safe to breastfeed infant.  Outcome: Progressing  Goal: Experiences normal breast weaning course  Description: INTERVENTIONS:  - Assess for and manage engorgement.  - Instruct on breast care.  - Provide comfort measures.  Outcome: Progressing  Goal: Appropriate maternal -  bonding  Description: INTERVENTIONS:  - Assess caregiver- interactions.  - Assess caregiver's emotional status and coping mechanisms.  -  Encourage caregiver to participate in  daily care.  - Assess support systems available to mother/family.  - Provide /case management support as needed.  Outcome: Progressing     Problem: GENITOURINARY - ADULT  Goal: Absence of urinary retention  Description: INTERVENTIONS:  - Assess patient’s ability to void and empty bladder  - Monitor intake/output and perform bladder scan as needed  - Follow urinary retention protocol/standard of care  - Consider collaborating with pharmacy to review patient's medication profile  - Implement strategies to promote bladder emptying  Outcome: Progressing

## 2024-03-21 NOTE — PROGRESS NOTES
FOCUS: MOM/BABY DISCHARGE    D: DISCHARGE ORDERS RECEIVED FROM PROVIDERS.    A: POSTPARTUM AND  DISCHARGE AVS'S GIVEN AND REVIEWED EXTENSIVELY, PRESCRIPTIONS/MEDICATIONS REVIEWED, AND DISCHARGE PAPERWORK SIGNED. VOCALIZED UNDERSTANDING. ID BANDS MATCHED. HUGS TAG REMOVED. PATIENT INFORMED WHEN TO MAKE FOLLOW UP APPOINTMENTS WITH PROVIDER AND PEDIATRICIAN.    R: PARENT(S) INTERACTING APPROPRIATELY WITH INFANT. VERBALIZED UNDERSTANDING OF FOLLOW UP INSTRUCTIONS. DISCHARGED IN STABLE CONDITION VIA WHEELCHAIR. INFANT DISCHARGED HOME IN CAR SEAT WITH PARENTS.

## 2024-03-21 NOTE — LACTATION NOTE
03/21/24 0815   Evaluation Type   Evaluation Type Inpatient   Problems identified   Problems identified Knowledge deficit   Problems Identified Other States BF is going well, latching better and more comfortable. Dischrage home today anticpated, reviewed appropriate questions and signs of continued exclusive breastfeeding signs of success, signs of deep latch and adequate feedings, gentle waking with skin to skin, continued lactation support services via OP and warm line services, encouraged parent led feedings,   Breastfeeding goal   Breastfeeding goal To maintain breast milk feeding per patient goal   Maternal Assessment   Prior breastfeeding experience (comment below) Multip;Unsuccessful   Prior BF experience: comment Tried very little at first and feels that she gave up quickly   Breastfeeding Assistance LC assistance declined at this time   Guidelines for use of:   Breast pump type Hand Pump;Other  (Hands free wearable pump, unknown name)   Other (comment) Encouraged to request assistance as needed.

## 2024-03-21 NOTE — DISCHARGE INSTRUCTIONS
-Pelvic rest for 6 weeks; no sex, tampons, douching, baths, or pools until after 6 weeks check-up.  -No heavy lifting; increase activity gradually.  -No driving if taking narcotics.    CALL YOUR PROVIDER IF:  Increased/heavy bleeding (I.e. Changing a saturated pad every hour).  New onset chills/fever greater than 100.4.  Pain in your vagina or abdomen that gets worse and isn't relieved with medicine.  Swelling or discharge with a bad odor from vagina.  Burning, pain, red streaks, or lumpy areas in your breasts that may be accompanied by flu-like symptoms.  Painful urination, or inability to control urination.  Nausea, vomiting, dizziness, or fainting.  Feelings of extreme sadness or anxiety, or a feeling that you don’t want to be with your baby.  Redness, warmth, or pain in the lower leg.  Chest pain or shortness of breath.      Mom & Baby Hour: Meets in person every WEDNESDAY at 10am at the [facility] in [place] ([address]) in the community education room. The group is for new moms and their babies up to 6 months of age. It includes breastfeeding supports with a certified lactation educator to be available to answer your breastfeeding questions.         YOU MAY TAKE MOTRIN 600 MG EVERY 6 HOURS FOR PAIN as needed. (YOU MAY TAKE YOUR NEXT DOSE AT 6PM TONIGHT)    PAIN MEDS:     Use tylenol 1000 mg every 6 hours as needed & ibuprofen 600 mg every 6 hours as needed. Best to stagger dosing -- for example take tylenol at noon & then ibuprofen at 3 pm then tylenol again at 6 pm, etc. You can start weaning the dosing as pain improves.  If gabapentin given, only use for moderate to severe breakthrough pain.

## 2024-04-18 ENCOUNTER — OFFICE VISIT (OUTPATIENT)
Dept: OBGYN CLINIC | Facility: CLINIC | Age: 34
End: 2024-04-18

## 2024-04-18 VITALS
BODY MASS INDEX: 23 KG/M2 | HEART RATE: 80 BPM | SYSTOLIC BLOOD PRESSURE: 105 MMHG | DIASTOLIC BLOOD PRESSURE: 73 MMHG | WEIGHT: 135.38 LBS

## 2024-04-18 DIAGNOSIS — N84.1 CERVICAL POLYP: Primary | ICD-10-CM

## 2024-04-18 NOTE — PROGRESS NOTES
Eliud Villarrealjoslyn    1990       Chief Complaint   Patient presents with    Postpartum Care     4 WK POSTPARTUM C/O POSSIBLE PROLAPSE   Patient is approximately 4 weeks postpartum from a normal spontaneous vaginal delivery.  She states that 4 days after discharge from the hospital she was having a bowel movement and noticed that something prolapsed from her vagina.  It reduced and went back again in a few hours and therefore she was not concerned about it.  She was doing Kegel's exercises to strengthen her pelvic muscles.  A few days ago she was also trying to have a bowel movement and again she felt something prolapsed through her vagina as she is concerned.  After questioning she admitted to Dr. Simmons did tell her that she had a small polyp on her cervix and that it could be removed after childbirth.    No past medical history on file.    No past surgical history on file.     Pap Date: 23  Pap Result Notes: PAP/HPV NEG      Current Outpatient Medications on File Prior to Visit   Medication Sig Dispense Refill    prenatal vitamin with DHA 27-0.8-228 MG Oral Cap Take 1 capsule by mouth daily.       No current facility-administered medications on file prior to visit.         PHYSICAL EXAM:       Constitutional: well developed, well nourished  Head/Face: normocephalic  Psychiatric:  Oriented to time, place, person and situation. Appropriate mood and affect    Pelvic Exam:  External Genitalia: normal appearance, hair distribution, and no lesions  Urethral Meatus:  normal in size, location, without lesions and prolapse  Vagina:  Normal appearance without lesions, no abnormal discharge  Cervix:  large 4x3cm cervical polyp originating from the anterior lip , base is too broad to removed with ring forceps   Perineum: healing well from laceration repair  Anus: no hemorhroids      Eliud was seen today for postpartum care.    Diagnoses and all orders for this visit:    Cervical polyp    Postpartum state  (HCC)      Pt has appt with [facility] in 2 more weeks for her postpartum exam, reassured pt that this is a benign process and that she and Dr Jones can discuss polyp removal plan at that visit

## 2024-04-19 ENCOUNTER — TELEPHONE (OUTPATIENT)
Dept: OBGYN UNIT | Facility: HOSPITAL | Age: 34
End: 2024-04-19

## 2024-04-30 ENCOUNTER — POSTPARTUM (OUTPATIENT)
Dept: OBGYN CLINIC | Facility: CLINIC | Age: 34
End: 2024-04-30

## 2024-04-30 VITALS
BODY MASS INDEX: 22 KG/M2 | HEART RATE: 67 BPM | DIASTOLIC BLOOD PRESSURE: 73 MMHG | SYSTOLIC BLOOD PRESSURE: 107 MMHG | WEIGHT: 134 LBS

## 2024-04-30 PROBLEM — O42.90 AMNIOTIC FLUID LEAKING (HCC): Status: RESOLVED | Noted: 2024-03-19 | Resolved: 2024-04-30

## 2024-04-30 PROBLEM — A74.9 POSITIVE CHLAMYDIA PCR: Status: RESOLVED | Noted: 2023-09-27 | Resolved: 2024-04-30

## 2024-04-30 PROCEDURE — 3074F SYST BP LT 130 MM HG: CPT | Performed by: OBSTETRICS & GYNECOLOGY

## 2024-04-30 PROCEDURE — 3078F DIAST BP <80 MM HG: CPT | Performed by: OBSTETRICS & GYNECOLOGY

## 2024-04-30 PROCEDURE — 96127 BRIEF EMOTIONAL/BEHAV ASSMT: CPT | Performed by: OBSTETRICS & GYNECOLOGY

## 2024-04-30 NOTE — PROGRESS NOTES
Eliud Mckeon is a 33 year old female  here for 6 week post-partum visit.  Patient delivered a  male infant on 3/19/2024  via vacuum, outlet.  Patient desires condoms for contraception.  Patient is breast feeding.   Patient denies symptoms of depression, [place]  depression scale score of 6 out of 30. She was seen at 4 week postpartum due to feeling her cervical polyp prolapsing, polyp was noted to be 4 cm at that time.     [place]  DEPRESSION SCALE  I have been able to laugh and see the funny side of things: As much as I always could  I have looked forward with enjoyment to things: Rather less than I used to  I have been anxious or worried for no good reason: No, not at all  I have felt scared or panicky for no good reason: Yes, sometimes  Things have been getting on top of me: No, most of the time I have coped quite well  I have been so unhappy that I have had difficulty sleeping: Not very often  I have felt sad or miserable: No, not at all  I have been so unhappy that I have been crying: No, never  The thought of harming myself has occurred to me: Never  Total: 6    OBSTETRIC HISTORY  OB History    Para Term  AB Living   2 2 2     2   SAB IAB Ectopic Multiple Live Births         0 2      # Outcome Date GA Lbr Bob/2nd Weight Sex Type Anes PTL Lv   2 Term 24 37w0d 18:33 / 00:18 7 lb 2.3 oz (3.24 kg) M Vag-Vacuum EPI, Local N OLEG      Complications: Late decelerations, Variable decelerations, Fetal bradycardia   1 Term 13 38w3d  7 lb (3.175 kg) M NORMAL SPONT   OLEG       GYNE HISTORY        MEDICATIONS:    Current Outpatient Medications:     prenatal vitamin with DHA 27-0.8-228 MG Oral Cap, Take 1 capsule by mouth daily., Disp: , Rfl:     ALLERGIES:  No Known Allergies    PHYSICAL EXAM  /73   Pulse 67   Wt 134 lb (60.8 kg)   LMP 2023 (Exact Date)   Breastfeeding Yes   BMI 22.30 kg/m²   General:    well nourished, well developed woman in no  acute distress  Abdomen:    soft, nontender, no masses  External Genitalia:  normal appearance, hair distribution, and no lesions  Vagina:    normal appearance without lesions, no abnormal discharge  Cervix:    normal without tenderness on motion, 2 cm cervical polyp  Uterus:    normal in size, contour, position, mobility, without tenderness  Adnexa:   normal without masses or tenderness  Perineum:   well healed perineum  Anus:    no hemorroids       ASSESSMENT/PLAN    Eliud was seen today for postpartum care.    Diagnoses and all orders for this visit:    Postpartum care and examination (HCC)        Discussed birthcontrol: Patient has chosen condom.  Patient to return for annual gyne exam in October.  RTC in 1-2 months for cervical polypectomy.

## 2024-06-24 ENCOUNTER — OFFICE VISIT (OUTPATIENT)
Dept: OBGYN CLINIC | Facility: CLINIC | Age: 34
End: 2024-06-24

## 2024-06-24 VITALS
SYSTOLIC BLOOD PRESSURE: 109 MMHG | DIASTOLIC BLOOD PRESSURE: 70 MMHG | HEART RATE: 66 BPM | WEIGHT: 135.63 LBS | BODY MASS INDEX: 23 KG/M2

## 2024-06-24 DIAGNOSIS — N84.1 CERVICAL POLYP: Primary | ICD-10-CM

## 2024-06-24 NOTE — PROCEDURES
Polypectomy       Consent signed.  Procedure discussed with the patient in detail including indication, risks, benefits, alternatives and complications.    Pelvic Exam Findings:  Cervix: polyp    Procedure:  Speculum placed in the vagina.  Hibaclens wash of cervix.  Polyp grasped with Ring forcep and twisted off base.  Silver nitrate and Monsel's used to achieve hemostasis.  Good hemostasis noted.  Patient tolerated procedure well.    Visit Plan:  Specimen sent to pathology.  Pelvic rest for 2 weeks.  Discharge instructions reviewed with patient.

## 2024-12-11 ENCOUNTER — OFFICE VISIT (OUTPATIENT)
Dept: OBGYN CLINIC | Facility: CLINIC | Age: 34
End: 2024-12-11
Payer: COMMERCIAL

## 2024-12-11 VITALS
SYSTOLIC BLOOD PRESSURE: 109 MMHG | WEIGHT: 133 LBS | BODY MASS INDEX: 22 KG/M2 | HEART RATE: 69 BPM | DIASTOLIC BLOOD PRESSURE: 75 MMHG

## 2024-12-11 DIAGNOSIS — Z11.3 SCREENING EXAMINATION FOR STD (SEXUALLY TRANSMITTED DISEASE): ICD-10-CM

## 2024-12-11 DIAGNOSIS — Z01.419 WELL WOMAN EXAM WITH ROUTINE GYNECOLOGICAL EXAM: Primary | ICD-10-CM

## 2024-12-11 PROCEDURE — 3078F DIAST BP <80 MM HG: CPT | Performed by: OBSTETRICS & GYNECOLOGY

## 2024-12-11 PROCEDURE — 3074F SYST BP LT 130 MM HG: CPT | Performed by: OBSTETRICS & GYNECOLOGY

## 2024-12-11 PROCEDURE — 99395 PREV VISIT EST AGE 18-39: CPT | Performed by: OBSTETRICS & GYNECOLOGY

## 2024-12-11 RX ORDER — MAGNESIUM 30 MG
250 TABLET ORAL 2 TIMES DAILY
COMMUNITY

## 2024-12-11 NOTE — PROGRESS NOTES
Eliud Mckeon is a 34 year old female  Patient's last menstrual period was 2024 (approximate).   Chief Complaint   Patient presents with    Gyn Exam     Annual    Presenting for well woman exam. Last pap smear was normal 2023. Monthly menses with normal flow. STD culture requested.     OBSTETRICS HISTORY:  OB History    Para Term  AB Living   2 2 2 0 0 2   SAB IAB Ectopic Multiple Live Births   0 0 0 0 2       GYNE HISTORY:  Patient's last menstrual period was 2024 (approximate).    History   Sexual Activity    Sexual activity: Not on file        Pap Date: 23  Pap Result Notes: Neg Pap/HPV      MEDICAL HISTORY:  History reviewed. No pertinent past medical history.      SURGICAL HISTORY:  History reviewed. No pertinent surgical history.    SOCIAL HISTORY:  Social History     Socioeconomic History    Marital status:      Spouse name: Not on file    Number of children: Not on file    Years of education: Not on file    Highest education level: Not on file   Occupational History    Not on file   Tobacco Use    Smoking status: Never    Smokeless tobacco: Never   Vaping Use    Vaping status: Never Used   Substance and Sexual Activity    Alcohol use: Not on file    Drug use: Never    Sexual activity: Not on file   Other Topics Concern    Not on file   Social History Narrative    Not on file     Social Drivers of Health     Financial Resource Strain: Low Risk  (3/19/2024)    Financial Resource Strain     Difficulty of Paying Living Expenses: Not hard at all     Med Affordability: No   Food Insecurity: No Food Insecurity (3/19/2024)    Food Insecurity     Food Insecurity: Never true   Transportation Needs: No Transportation Needs (3/19/2024)    Transportation Needs     Lack of Transportation: No   Stress: No Stress Concern Present (3/19/2024)    Stress     Feeling of Stress : No   Housing Stability: Low Risk  (3/19/2024)    Housing Stability     Housing Instability: No      Housing Instability Emergency: Not on file     Crib or Bassinette: Not on file         Depression Screening (PHQ-2/PHQ-9): Over the LAST 2 WEEKS   Little interest or pleasure in doing things (over the last two weeks)?: Not at all    Feeling down, depressed, or hopeless (over the last two weeks)?: Not at all (Occasional per pt)    PHQ-2 SCORE: 0           MEDICATIONS:    Current Outpatient Medications:     magnesium 30 MG Oral Tab, Take 250 mg by mouth 2 (two) times daily., Disp: , Rfl:     Misc Natural Products (IMMUNE BOOST OR), Take by mouth., Disp: , Rfl:     prenatal vitamin with DHA 27-0.8-228 MG Oral Cap, Take 1 capsule by mouth daily., Disp: , Rfl:     ALLERGIES:  Allergies[1]      Review of Systems:  Review of Systems   All other systems reviewed and are negative.       Vitals:    12/11/24 1413   BP: 109/75   Pulse: 69       PHYSICAL EXAM:   Physical Exam  Vitals reviewed.   Constitutional:       Appearance: Normal appearance.   HENT:      Head: Atraumatic.   Eyes:      Pupils: Pupils are equal, round, and reactive to light.   Pulmonary:      Effort: Pulmonary effort is normal.   Chest:   Breasts:     Right: Normal. No bleeding, inverted nipple, mass, nipple discharge, skin change or tenderness.      Left: Normal. No bleeding, inverted nipple, mass, nipple discharge, skin change or tenderness.   Abdominal:      General: Abdomen is flat.      Palpations: Abdomen is soft.      Tenderness: There is no abdominal tenderness.   Genitourinary:     General: Normal vulva.      Exam position: Lithotomy position.      Labia:         Right: No rash, tenderness, lesion or injury.         Left: No rash, tenderness, lesion or injury.       Vagina: Normal.      Cervix: Normal.      Uterus: Normal. Not tender.       Adnexa: Right adnexa normal and left adnexa normal.        Right: No tenderness or fullness.          Left: No tenderness or fullness.     Lymphadenopathy:      Upper Body:      Right upper body: No  supraclavicular, axillary or pectoral adenopathy.      Left upper body: No supraclavicular, axillary or pectoral adenopathy.   Skin:     General: Skin is warm and dry.   Neurological:      General: No focal deficit present.      Mental Status: She is alert and oriented to person, place, and time.   Psychiatric:         Mood and Affect: Mood normal.         Behavior: Behavior normal.         Thought Content: Thought content normal.         Judgment: Judgment normal.           Assessment & Plan:  Eliud was seen today for gyn exam.    Diagnoses and all orders for this visit:    Well woman exam with routine gynecological exam    Screening examination for STD (sexually transmitted disease)  -     Chlamydia/Gc Amplification; Future  -     Trichomonas vaginalis, GISELLA (Vaginal/Cervical); Future  -     Trichomonas vaginalis, GISELLA (Vaginal/Cervical)  -     Chlamydia/Gc Amplification        Requested Prescriptions      No prescriptions requested or ordered in this encounter       Next pap smear due 2026. STD culture collected. Encourage SBE. Recommend exams yearly.          [1] No Known Allergies

## 2024-12-12 LAB
C TRACH DNA SPEC QL NAA+PROBE: NEGATIVE
N GONORRHOEA DNA SPEC QL NAA+PROBE: NEGATIVE

## 2024-12-13 LAB — T VAGINALIS RRNA SPEC QL NAA+PROBE: NEGATIVE

## (undated) NOTE — LETTER
Dear New MomNaveen, we missed you! The nurses of [facility] have tried to reach you by phone to ask if you have any questions regarding your health or the health and care of your new little one.    We hope you are doing well. If, for any reason, you have questions or concerns about your health or your baby’s health, please contact your provider or your pediatrician or family medicine physician regarding your baby.     At [facility], we feel that postpartum support is very important for new families. Please see the enclosed new parent support flyer that lists support programs and resources with both in-person and online options.     Additionally, our Breastfeeding Centers at [facility] and [facility] in [place], offer outpatient visits with our International Board-Certified Lactation   Consultants (IBCLCs) for any breastfeeding concerns or questions you may have.    For issues related to stress, anxiety or depression, we have a Nurturing Mom support group that meets both in-person or online.  There’s also a 24-hour Mom’s Line where you can request a phone call from a clinical therapist for assistance for postpartum depression.    We encourage you to take advantage of these programs and resources as you recover from childbirth and learn to care for your new infant.    Best wishes,    [facility]            [phone]

## (undated) NOTE — LETTER
2/7/2024              Eliud Mckeon        [address]        [place] [address]       To whom it may concern.       Patient is pregnant and under my care. She is cleared for plane travel on the week of February 11th    Sincerely,    Petar Cabrera DO  [facility]  [address] [facility] [address] [phone]        Document electronically generated by:  Petar Cabrera DO

## (undated) NOTE — LETTER
AUTHORIZATION FOR SURGICAL OPERATION OR OTHER PROCEDURE    1. I hereby authorize Dr. TOBAR, and [facility] staff assigned to my case to perform the following operation and/or procedure at the [facility] site:    _______________________________________________________________________________________________    POLYPECTOMY   _______________________________________________________________________________________________    2.  My physician has explained the nature and purpose of the operation or other procedure, possible alternative methods of treatment, the risks involved, and the possibility of complication to me.  I acknowledge that no guarantee has been made as to the result that may be obtained.  3.  I recognize that, during the course of this operation, or other procedure, unforseen conditions may necessitate additional or different procedure than those listed above.  I, therefore, further authorize and request that the above named physician, his/her physician assistants or designees perform such procedures as are, in his/her professional opinion, necessary and desirable.  4.  Any tissue or organs removed in the operation or other procedure may be disposed of by and at the discretion of the [facility] and [facility].  5.  I understand that in the event of a medical emergency, I will be transported by local paramedics to [facility] or other hospital emergency department.  6.  I certify that I have read and fully understand the above consent to operation and/or other procedure.    7.  I acknowledge that my physician has explained sedation/analgesia administration to me including the risks and benefits.  I consent to the administration of sedation/analgesia as may be necessary or desirable in the judgement of my physician.    Witness signature: ___________________________________________________ Date:  ______/______/_____                    Time:   ________ A.M.  P.M.       Patient Name:  ______________________________________________________  (please print)      Patient signature:  ___________________________________________________             Relationship to Patient:           []  Parent    Responsible person                          []  Spouse  In case of minor or                    [] Other  _____________   Incompetent name:  __________________________________________________                               (please print)      _____________      Responsible person  In case of minor or  Incompetent signature:  _______________________________________________    Statement of Physician  My signature below affirms that prior to the time of the procedure, I have explained to the patient and/or his/her guardian, the risks and benefits involved in the proposed treatment and any reasonable alternative to the proposed treatment.  I have also explained the risks and benefits involved in the refusal of the proposed treatment and have answered the patient's questions.                        Date:  ______/______/_______  Provider                      Signature:  __________________________________________________________       Time:  ___________ A.M    P.M.